# Patient Record
Sex: MALE | Race: ASIAN | Employment: FULL TIME | URBAN - METROPOLITAN AREA
[De-identification: names, ages, dates, MRNs, and addresses within clinical notes are randomized per-mention and may not be internally consistent; named-entity substitution may affect disease eponyms.]

---

## 2017-08-01 ENCOUNTER — MED REC SCAN ONLY (OUTPATIENT)
Dept: INTERNAL MEDICINE CLINIC | Facility: CLINIC | Age: 37
End: 2017-08-01

## 2017-08-15 ENCOUNTER — LAB ENCOUNTER (OUTPATIENT)
Dept: LAB | Facility: HOSPITAL | Age: 37
End: 2017-08-15
Attending: INTERNAL MEDICINE
Payer: COMMERCIAL

## 2017-08-15 DIAGNOSIS — K51.919 ULCERATIVE COLITIS WITH COMPLICATION, UNSPECIFIED LOCATION (HCC): ICD-10-CM

## 2017-08-15 LAB
25-HYDROXYVITAMIN D (TOTAL): 28.2 NG/ML (ref 30–100)
ALBUMIN SERPL-MCNC: 3.8 G/DL (ref 3.5–4.8)
ALP LIVER SERPL-CCNC: 63 U/L (ref 45–117)
ALT SERPL-CCNC: 17 U/L (ref 17–63)
AST SERPL-CCNC: 12 U/L (ref 15–41)
BASOPHILS # BLD AUTO: 0.05 X10(3) UL (ref 0–0.1)
BASOPHILS NFR BLD AUTO: 1.1 %
BILIRUB SERPL-MCNC: 0.7 MG/DL (ref 0.1–2)
BUN BLD-MCNC: 15 MG/DL (ref 8–20)
C-REACTIVE PROTEIN: <0.29 MG/DL (ref ?–1)
CALCIUM BLD-MCNC: 9.7 MG/DL (ref 8.3–10.3)
CHLORIDE: 105 MMOL/L (ref 101–111)
CO2: 31 MMOL/L (ref 22–32)
CREAT BLD-MCNC: 1.09 MG/DL (ref 0.7–1.3)
EOSINOPHIL # BLD AUTO: 0.14 X10(3) UL (ref 0–0.3)
EOSINOPHIL NFR BLD AUTO: 3 %
ERYTHROCYTE [DISTWIDTH] IN BLOOD BY AUTOMATED COUNT: 12.5 % (ref 11.5–16)
FOLATE (FOLIC ACID), SERUM: 24.5 NG/ML (ref 8.7–24)
GLUCOSE BLD-MCNC: 117 MG/DL (ref 70–99)
HAV AB SERPL IA-ACNC: 481 PG/ML (ref 193–986)
HCT VFR BLD AUTO: 39 % (ref 37–53)
HGB BLD-MCNC: 13.6 G/DL (ref 13–17)
IMMATURE GRANULOCYTE COUNT: 0.01 X10(3) UL (ref 0–1)
IMMATURE GRANULOCYTE RATIO %: 0.2 %
LYMPHOCYTES # BLD AUTO: 2.65 X10(3) UL (ref 0.9–4)
LYMPHOCYTES NFR BLD AUTO: 56.7 %
M PROTEIN MFR SERPL ELPH: 7.7 G/DL (ref 6.1–8.3)
MCH RBC QN AUTO: 31.9 PG (ref 27–33.2)
MCHC RBC AUTO-ENTMCNC: 34.9 G/DL (ref 31–37)
MCV RBC AUTO: 91.5 FL (ref 80–99)
MONOCYTES # BLD AUTO: 0.51 X10(3) UL (ref 0.1–0.6)
MONOCYTES NFR BLD AUTO: 10.9 %
NEUTROPHIL ABS PRELIM: 1.31 X10 (3) UL (ref 1.3–6.7)
NEUTROPHILS # BLD AUTO: 1.31 X10(3) UL (ref 1.3–6.7)
NEUTROPHILS NFR BLD AUTO: 28.1 %
PLATELET # BLD AUTO: 178 10(3)UL (ref 150–450)
POTASSIUM SERPL-SCNC: 3.8 MMOL/L (ref 3.6–5.1)
RBC # BLD AUTO: 4.26 X10(6)UL (ref 4.3–5.7)
RED CELL DISTRIBUTION WIDTH-SD: 40.8 FL (ref 35.1–46.3)
SODIUM SERPL-SCNC: 141 MMOL/L (ref 136–144)
WBC # BLD AUTO: 4.7 X10(3) UL (ref 4–13)

## 2017-08-15 PROCEDURE — 82306 VITAMIN D 25 HYDROXY: CPT

## 2017-08-15 PROCEDURE — 82746 ASSAY OF FOLIC ACID SERUM: CPT

## 2017-08-15 PROCEDURE — 85025 COMPLETE CBC W/AUTO DIFF WBC: CPT

## 2017-08-15 PROCEDURE — 36415 COLL VENOUS BLD VENIPUNCTURE: CPT

## 2017-08-15 PROCEDURE — 82607 VITAMIN B-12: CPT

## 2017-08-15 PROCEDURE — 80053 COMPREHEN METABOLIC PANEL: CPT

## 2017-08-15 PROCEDURE — 86140 C-REACTIVE PROTEIN: CPT

## 2018-02-14 ENCOUNTER — LAB ENCOUNTER (OUTPATIENT)
Dept: LAB | Facility: HOSPITAL | Age: 38
End: 2018-02-14
Attending: INTERNAL MEDICINE
Payer: COMMERCIAL

## 2018-02-14 DIAGNOSIS — K51.018 ULCERATIVE PANCOLITIS WITH OTHER COMPLICATION (HCC): ICD-10-CM

## 2018-02-14 PROCEDURE — 83993 ASSAY FOR CALPROTECTIN FECAL: CPT

## 2018-02-14 PROCEDURE — 87493 C DIFF AMPLIFIED PROBE: CPT

## 2018-02-15 NOTE — PROGRESS NOTES
Results reviewed. Released to [address] Tests show no significant abnormalities. Patient notified.

## 2018-02-17 LAB — CALPROTECTIN, FECAL: 60 UG/G

## 2018-04-21 ENCOUNTER — LAB ENCOUNTER (OUTPATIENT)
Dept: LAB | Facility: HOSPITAL | Age: 38
End: 2018-04-21
Attending: INTERNAL MEDICINE
Payer: COMMERCIAL

## 2018-04-21 DIAGNOSIS — K51.00 ULCERATIVE PANCOLITIS WITHOUT COMPLICATION (HCC): ICD-10-CM

## 2018-04-21 PROCEDURE — 82306 VITAMIN D 25 HYDROXY: CPT

## 2018-04-21 PROCEDURE — 86140 C-REACTIVE PROTEIN: CPT

## 2018-04-21 PROCEDURE — 83540 ASSAY OF IRON: CPT

## 2018-04-21 PROCEDURE — 85025 COMPLETE CBC W/AUTO DIFF WBC: CPT

## 2018-04-21 PROCEDURE — 80053 COMPREHEN METABOLIC PANEL: CPT

## 2018-04-21 PROCEDURE — 83550 IRON BINDING TEST: CPT

## 2018-04-21 PROCEDURE — 82607 VITAMIN B-12: CPT

## 2018-04-21 PROCEDURE — 36415 COLL VENOUS BLD VENIPUNCTURE: CPT

## 2018-06-15 ENCOUNTER — LAB ENCOUNTER (OUTPATIENT)
Dept: LAB | Facility: HOSPITAL | Age: 38
End: 2018-06-15
Attending: INTERNAL MEDICINE
Payer: COMMERCIAL

## 2018-06-15 DIAGNOSIS — K51.00 ULCERATIVE CHRONIC PANCOLITIS WITHOUT COMPLICATIONS (HCC): ICD-10-CM

## 2018-06-15 PROCEDURE — 86480 TB TEST CELL IMMUN MEASURE: CPT

## 2018-06-15 PROCEDURE — 87340 HEPATITIS B SURFACE AG IA: CPT

## 2018-06-15 PROCEDURE — 36415 COLL VENOUS BLD VENIPUNCTURE: CPT

## 2018-06-15 PROCEDURE — 86706 HEP B SURFACE ANTIBODY: CPT

## 2018-06-15 PROCEDURE — 86704 HEP B CORE ANTIBODY TOTAL: CPT

## 2018-06-20 NOTE — PROGRESS NOTES
Results reviewed. Released to [address] Has no immunity to hepatitis B.  Needs vaccination series, please notify pt to set up with PCP

## 2018-08-16 ENCOUNTER — LAB ENCOUNTER (OUTPATIENT)
Dept: LAB | Facility: HOSPITAL | Age: 38
End: 2018-08-16
Attending: INTERNAL MEDICINE
Payer: COMMERCIAL

## 2018-08-16 DIAGNOSIS — K51.019 CHRONIC ULCERATIVE ENTEROCOLITIS WITH COMPLICATION (HCC): ICD-10-CM

## 2018-08-16 LAB
ALBUMIN SERPL-MCNC: 4 G/DL (ref 3.5–4.8)
ALBUMIN/GLOB SERPL: 1 {RATIO} (ref 1–2)
ALP LIVER SERPL-CCNC: 53 U/L (ref 45–117)
ALT SERPL-CCNC: 22 U/L (ref 17–63)
ANION GAP SERPL CALC-SCNC: 3 MMOL/L (ref 0–18)
AST SERPL-CCNC: 15 U/L (ref 15–41)
BASOPHILS # BLD AUTO: 0.05 X10(3) UL (ref 0–0.1)
BASOPHILS NFR BLD AUTO: 0.8 %
BILIRUB SERPL-MCNC: 0.7 MG/DL (ref 0.1–2)
BUN BLD-MCNC: 19 MG/DL (ref 8–20)
BUN/CREAT SERPL: 18.8 (ref 10–20)
CALCIUM BLD-MCNC: 8.9 MG/DL (ref 8.3–10.3)
CHLORIDE SERPL-SCNC: 106 MMOL/L (ref 101–111)
CO2 SERPL-SCNC: 31 MMOL/L (ref 22–32)
CREAT BLD-MCNC: 1.01 MG/DL (ref 0.7–1.3)
EOSINOPHIL # BLD AUTO: 0.19 X10(3) UL (ref 0–0.3)
EOSINOPHIL NFR BLD AUTO: 2.9 %
ERYTHROCYTE [DISTWIDTH] IN BLOOD BY AUTOMATED COUNT: 12.2 % (ref 11.5–16)
GLOBULIN PLAS-MCNC: 3.9 G/DL (ref 2.5–4)
GLUCOSE BLD-MCNC: 107 MG/DL (ref 70–99)
HCT VFR BLD AUTO: 41.1 % (ref 37–53)
HGB BLD-MCNC: 13.7 G/DL (ref 13–17)
IMMATURE GRANULOCYTE COUNT: 0.02 X10(3) UL (ref 0–1)
IMMATURE GRANULOCYTE RATIO %: 0.3 %
LYMPHOCYTES # BLD AUTO: 2.64 X10(3) UL (ref 0.9–4)
LYMPHOCYTES NFR BLD AUTO: 40.9 %
M PROTEIN MFR SERPL ELPH: 7.9 G/DL (ref 6.1–8.3)
MCH RBC QN AUTO: 30.2 PG (ref 27–33.2)
MCHC RBC AUTO-ENTMCNC: 33.3 G/DL (ref 31–37)
MCV RBC AUTO: 90.5 FL (ref 80–99)
MONOCYTES # BLD AUTO: 0.57 X10(3) UL (ref 0.1–1)
MONOCYTES NFR BLD AUTO: 8.8 %
NEUTROPHIL ABS PRELIM: 2.99 X10 (3) UL (ref 1.3–6.7)
NEUTROPHILS # BLD AUTO: 2.99 X10(3) UL (ref 1.3–6.7)
NEUTROPHILS NFR BLD AUTO: 46.3 %
OSMOLALITY SERPL CALC.SUM OF ELEC: 293 MOSM/KG (ref 275–295)
PLATELET # BLD AUTO: 217 10(3)UL (ref 150–450)
POTASSIUM SERPL-SCNC: 4 MMOL/L (ref 3.6–5.1)
RBC # BLD AUTO: 4.54 X10(6)UL (ref 4.3–5.7)
RED CELL DISTRIBUTION WIDTH-SD: 39.8 FL (ref 35.1–46.3)
SODIUM SERPL-SCNC: 140 MMOL/L (ref 136–144)
WBC # BLD AUTO: 6.5 X10(3) UL (ref 4–13)

## 2018-08-16 PROCEDURE — 80299 QUANTITATIVE ASSAY DRUG: CPT

## 2018-08-16 PROCEDURE — 36415 COLL VENOUS BLD VENIPUNCTURE: CPT

## 2018-08-16 PROCEDURE — 80053 COMPREHEN METABOLIC PANEL: CPT

## 2018-08-16 PROCEDURE — 86352 CELL FUNCTION ASSAY W/STIM: CPT

## 2018-08-16 PROCEDURE — 85025 COMPLETE CBC W/AUTO DIFF WBC: CPT

## 2018-08-18 LAB
INFLIXIMAB ACTIVITY: 1.96 UG/ML
INFLIXIMAB NEUTRALIZING AB TTR: NOT DETECTED

## 2018-08-20 ENCOUNTER — OFFICE VISIT (OUTPATIENT)
Dept: INTERNAL MEDICINE CLINIC | Facility: CLINIC | Age: 38
End: 2018-08-20
Payer: COMMERCIAL

## 2018-08-20 ENCOUNTER — TELEPHONE (OUTPATIENT)
Dept: INTERNAL MEDICINE CLINIC | Facility: CLINIC | Age: 38
End: 2018-08-20

## 2018-08-20 VITALS
SYSTOLIC BLOOD PRESSURE: 102 MMHG | DIASTOLIC BLOOD PRESSURE: 66 MMHG | HEIGHT: 72 IN | BODY MASS INDEX: 26.55 KG/M2 | WEIGHT: 196 LBS | RESPIRATION RATE: 16 BRPM | HEART RATE: 76 BPM | TEMPERATURE: 98 F

## 2018-08-20 DIAGNOSIS — M54.42 CHRONIC LEFT-SIDED LOW BACK PAIN WITH LEFT-SIDED SCIATICA: Primary | ICD-10-CM

## 2018-08-20 DIAGNOSIS — Z13.228 SCREENING FOR METABOLIC DISORDER: ICD-10-CM

## 2018-08-20 DIAGNOSIS — Z13.89 SCREENING FOR BLOOD OR PROTEIN IN URINE: ICD-10-CM

## 2018-08-20 DIAGNOSIS — Z13.29 SCREENING FOR THYROID DISORDER: ICD-10-CM

## 2018-08-20 DIAGNOSIS — Z13.1 SCREENING FOR DIABETES MELLITUS (DM): ICD-10-CM

## 2018-08-20 DIAGNOSIS — Z13.0 SCREENING, ANEMIA, DEFICIENCY, IRON: ICD-10-CM

## 2018-08-20 DIAGNOSIS — G89.29 CHRONIC LEFT-SIDED LOW BACK PAIN WITH LEFT-SIDED SCIATICA: Primary | ICD-10-CM

## 2018-08-20 DIAGNOSIS — Z12.83 SKIN EXAM FOR MALIGNANT NEOPLASM: ICD-10-CM

## 2018-08-20 DIAGNOSIS — Z13.220 SCREENING FOR LIPID DISORDERS: ICD-10-CM

## 2018-08-20 PROCEDURE — 99214 OFFICE O/P EST MOD 30 MIN: CPT | Performed by: INTERNAL MEDICINE

## 2018-08-20 RX ORDER — INFLIXIMAB 100 MG/10ML
900 INJECTION, POWDER, LYOPHILIZED, FOR SOLUTION INTRAVENOUS
COMMUNITY
Start: 2016-01-28 | End: 2018-12-10 | Stop reason: ALTCHOICE

## 2018-08-20 NOTE — PATIENT INSTRUCTIONS
Self-Care for Low Back Pain    Most people have low back pain now and then. In many cases, it isn’t serious and self-care can help. Sometimes low back pain can be a sign of a bigger problem.  Call your healthcare provider if your pain returns often or get · You have a sharp, stabbing pain. · Your pain is constant. · You have pain, tingling, or numbness in your leg. · You feel pain in a new area of your back. · You notice that the pain isn’t decreasing after more than a week.    Date Last Reviewed: 3/1/20

## 2018-08-23 LAB
6-METHYLMERCAPTOPURINE (6-MMP): 2004
6-THIOGUANINE (6-GT): 124

## 2018-09-05 ENCOUNTER — HOSPITAL ENCOUNTER (OUTPATIENT)
Dept: MRI IMAGING | Facility: HOSPITAL | Age: 38
Discharge: HOME | End: 2018-09-05
Attending: INTERNAL MEDICINE
Payer: COMMERCIAL

## 2018-09-05 DIAGNOSIS — G89.29 CHRONIC LEFT-SIDED LOW BACK PAIN WITH LEFT-SIDED SCIATICA: ICD-10-CM

## 2018-09-05 DIAGNOSIS — M54.42 CHRONIC LEFT-SIDED LOW BACK PAIN WITH LEFT-SIDED SCIATICA: ICD-10-CM

## 2018-09-05 PROCEDURE — 72148 MRI LUMBAR SPINE W/O DYE: CPT | Performed by: INTERNAL MEDICINE

## 2018-09-06 ENCOUNTER — LAB ENCOUNTER (OUTPATIENT)
Dept: LAB | Facility: HOSPITAL | Age: 38
End: 2018-09-06
Attending: INTERNAL MEDICINE
Payer: COMMERCIAL

## 2018-09-06 ENCOUNTER — TELEPHONE (OUTPATIENT)
Dept: INTERNAL MEDICINE CLINIC | Facility: CLINIC | Age: 38
End: 2018-09-06

## 2018-09-06 DIAGNOSIS — M54.42 CHRONIC LEFT-SIDED LOW BACK PAIN WITH BILATERAL SCIATICA: Primary | ICD-10-CM

## 2018-09-06 DIAGNOSIS — G89.29 CHRONIC LEFT-SIDED LOW BACK PAIN WITH BILATERAL SCIATICA: Primary | ICD-10-CM

## 2018-09-06 DIAGNOSIS — K51.018 ULCERATIVE PANCOLITIS WITH OTHER COMPLICATION (HCC): ICD-10-CM

## 2018-09-06 DIAGNOSIS — M54.41 CHRONIC LEFT-SIDED LOW BACK PAIN WITH BILATERAL SCIATICA: Primary | ICD-10-CM

## 2018-09-06 DIAGNOSIS — M48.00 SPINAL STENOSIS, UNSPECIFIED SPINAL REGION: ICD-10-CM

## 2018-09-06 LAB
ALBUMIN SERPL-MCNC: 3.6 G/DL (ref 3.5–4.8)
ALBUMIN/GLOB SERPL: 0.9 {RATIO} (ref 1–2)
ALP LIVER SERPL-CCNC: 55 U/L (ref 45–117)
ALT SERPL-CCNC: 23 U/L (ref 17–63)
ANION GAP SERPL CALC-SCNC: 6 MMOL/L (ref 0–18)
AST SERPL-CCNC: 19 U/L (ref 15–41)
BASOPHILS # BLD AUTO: 0.05 X10(3) UL (ref 0–0.1)
BASOPHILS NFR BLD AUTO: 0.7 %
BILIRUB SERPL-MCNC: 0.8 MG/DL (ref 0.1–2)
BUN BLD-MCNC: 17 MG/DL (ref 8–20)
BUN/CREAT SERPL: 14.9 (ref 10–20)
CALCIUM BLD-MCNC: 8.6 MG/DL (ref 8.3–10.3)
CHLORIDE SERPL-SCNC: 105 MMOL/L (ref 101–111)
CO2 SERPL-SCNC: 30 MMOL/L (ref 22–32)
CREAT BLD-MCNC: 1.14 MG/DL (ref 0.7–1.3)
CRP SERPL-MCNC: <0.29 MG/DL (ref ?–1)
EOSINOPHIL # BLD AUTO: 0.16 X10(3) UL (ref 0–0.3)
EOSINOPHIL NFR BLD AUTO: 2.2 %
ERYTHROCYTE [DISTWIDTH] IN BLOOD BY AUTOMATED COUNT: 12.8 % (ref 11.5–16)
GLOBULIN PLAS-MCNC: 4 G/DL (ref 2.5–4)
GLUCOSE BLD-MCNC: 132 MG/DL (ref 70–99)
HCT VFR BLD AUTO: 42.2 % (ref 37–53)
HGB BLD-MCNC: 14.8 G/DL (ref 13–17)
IMMATURE GRANULOCYTE COUNT: 0.03 X10(3) UL (ref 0–1)
IMMATURE GRANULOCYTE RATIO %: 0.4 %
LIPASE: 123 U/L (ref 73–393)
LYMPHOCYTES # BLD AUTO: 2.33 X10(3) UL (ref 0.9–4)
LYMPHOCYTES NFR BLD AUTO: 31.9 %
M PROTEIN MFR SERPL ELPH: 7.6 G/DL (ref 6.1–8.3)
MCH RBC QN AUTO: 31.8 PG (ref 27–33.2)
MCHC RBC AUTO-ENTMCNC: 35.1 G/DL (ref 31–37)
MCV RBC AUTO: 90.6 FL (ref 80–99)
MONOCYTES # BLD AUTO: 0.57 X10(3) UL (ref 0.1–1)
MONOCYTES NFR BLD AUTO: 7.8 %
NEUTROPHIL ABS PRELIM: 4.16 X10 (3) UL (ref 1.3–6.7)
NEUTROPHILS # BLD AUTO: 4.16 X10(3) UL (ref 1.3–6.7)
NEUTROPHILS NFR BLD AUTO: 57 %
OSMOLALITY SERPL CALC.SUM OF ELEC: 295 MOSM/KG (ref 275–295)
PLATELET # BLD AUTO: 208 10(3)UL (ref 150–450)
POTASSIUM SERPL-SCNC: 3.6 MMOL/L (ref 3.6–5.1)
RBC # BLD AUTO: 4.66 X10(6)UL (ref 4.3–5.7)
RED CELL DISTRIBUTION WIDTH-SD: 41.7 FL (ref 35.1–46.3)
SED RATE-ML: 78 MM/HR (ref 0–12)
SODIUM SERPL-SCNC: 141 MMOL/L (ref 136–144)
WBC # BLD AUTO: 7.3 X10(3) UL (ref 4–13)

## 2018-09-06 PROCEDURE — 36415 COLL VENOUS BLD VENIPUNCTURE: CPT

## 2018-09-06 PROCEDURE — 87493 C DIFF AMPLIFIED PROBE: CPT

## 2018-09-06 PROCEDURE — 83690 ASSAY OF LIPASE: CPT

## 2018-09-06 PROCEDURE — 85652 RBC SED RATE AUTOMATED: CPT

## 2018-09-06 PROCEDURE — 80053 COMPREHEN METABOLIC PANEL: CPT

## 2018-09-06 PROCEDURE — 86140 C-REACTIVE PROTEIN: CPT

## 2018-09-06 PROCEDURE — 85025 COMPLETE CBC W/AUTO DIFF WBC: CPT

## 2018-09-06 NOTE — TELEPHONE ENCOUNTER
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

## 2018-09-06 NOTE — TELEPHONE ENCOUNTER
Glenda Dash MD   Pain Medicine   [facility]. [facility] [address] [place] [address]   Phone: [phone]   Fax: [phone]      Pt is aware of the results. The referral was entered.  The pt will call the office back for the

## 2018-09-07 ENCOUNTER — HOSPITAL ENCOUNTER (EMERGENCY)
Facility: HOSPITAL | Age: 38
Discharge: HOME | End: 2018-09-07
Attending: EMERGENCY MEDICINE
Payer: COMMERCIAL

## 2018-09-07 ENCOUNTER — APPOINTMENT (OUTPATIENT)
Dept: CT IMAGING | Facility: HOSPITAL | Age: 38
End: 2018-09-07
Attending: EMERGENCY MEDICINE
Payer: COMMERCIAL

## 2018-09-07 VITALS
WEIGHT: 192 LBS | OXYGEN SATURATION: 98 % | HEART RATE: 65 BPM | TEMPERATURE: 98 F | DIASTOLIC BLOOD PRESSURE: 77 MMHG | SYSTOLIC BLOOD PRESSURE: 111 MMHG | HEIGHT: 71 IN | BODY MASS INDEX: 26.88 KG/M2 | RESPIRATION RATE: 14 BRPM

## 2018-09-07 DIAGNOSIS — K51.00 ULCERATIVE PANCOLITIS WITHOUT COMPLICATION (HCC): Primary | ICD-10-CM

## 2018-09-07 LAB
ALBUMIN SERPL-MCNC: 3.6 G/DL (ref 3.5–4.8)
ALBUMIN/GLOB SERPL: 0.9 {RATIO} (ref 1–2)
ALP LIVER SERPL-CCNC: 55 U/L (ref 45–117)
ALT SERPL-CCNC: 18 U/L (ref 17–63)
ANION GAP SERPL CALC-SCNC: 4 MMOL/L (ref 0–18)
AST SERPL-CCNC: 29 U/L (ref 15–41)
BASOPHILS # BLD AUTO: 0.06 X10(3) UL (ref 0–0.1)
BASOPHILS NFR BLD AUTO: 0.8 %
BILIRUB SERPL-MCNC: 0.8 MG/DL (ref 0.1–2)
BUN BLD-MCNC: 15 MG/DL (ref 8–20)
BUN/CREAT SERPL: 15.5 (ref 10–20)
CALCIUM BLD-MCNC: 8.8 MG/DL (ref 8.3–10.3)
CHLORIDE SERPL-SCNC: 106 MMOL/L (ref 101–111)
CO2 SERPL-SCNC: 30 MMOL/L (ref 22–32)
CREAT BLD-MCNC: 0.97 MG/DL (ref 0.7–1.3)
EOSINOPHIL # BLD AUTO: 0.17 X10(3) UL (ref 0–0.3)
EOSINOPHIL NFR BLD AUTO: 2.3 %
ERYTHROCYTE [DISTWIDTH] IN BLOOD BY AUTOMATED COUNT: 12.8 % (ref 11.5–16)
GLOBULIN PLAS-MCNC: 4 G/DL (ref 2.5–4)
GLUCOSE BLD-MCNC: 99 MG/DL (ref 70–99)
HCT VFR BLD AUTO: 42.3 % (ref 37–53)
HGB BLD-MCNC: 14.5 G/DL (ref 13–17)
IMMATURE GRANULOCYTE COUNT: 0.03 X10(3) UL (ref 0–1)
IMMATURE GRANULOCYTE RATIO %: 0.4 %
LYMPHOCYTES # BLD AUTO: 2.63 X10(3) UL (ref 0.9–4)
LYMPHOCYTES NFR BLD AUTO: 34.9 %
M PROTEIN MFR SERPL ELPH: 7.6 G/DL (ref 6.1–8.3)
MCH RBC QN AUTO: 31 PG (ref 27–33.2)
MCHC RBC AUTO-ENTMCNC: 34.3 G/DL (ref 31–37)
MCV RBC AUTO: 90.4 FL (ref 80–99)
MONOCYTES # BLD AUTO: 0.67 X10(3) UL (ref 0.1–1)
MONOCYTES NFR BLD AUTO: 8.9 %
NEUTROPHIL ABS PRELIM: 3.97 X10 (3) UL (ref 1.3–6.7)
NEUTROPHILS # BLD AUTO: 3.97 X10(3) UL (ref 1.3–6.7)
NEUTROPHILS NFR BLD AUTO: 52.7 %
OSMOLALITY SERPL CALC.SUM OF ELEC: 291 MOSM/KG (ref 275–295)
PLATELET # BLD AUTO: 174 10(3)UL (ref 150–450)
POTASSIUM SERPL-SCNC: 3.6 MMOL/L (ref 3.6–5.1)
RBC # BLD AUTO: 4.68 X10(6)UL (ref 4.3–5.7)
RED CELL DISTRIBUTION WIDTH-SD: 41.6 FL (ref 35.1–46.3)
SODIUM SERPL-SCNC: 140 MMOL/L (ref 136–144)
WBC # BLD AUTO: 7.5 X10(3) UL (ref 4–13)

## 2018-09-07 PROCEDURE — 85025 COMPLETE CBC W/AUTO DIFF WBC: CPT | Performed by: EMERGENCY MEDICINE

## 2018-09-07 PROCEDURE — S0028 INJECTION, FAMOTIDINE, 20 MG: HCPCS | Performed by: EMERGENCY MEDICINE

## 2018-09-07 PROCEDURE — 99284 EMERGENCY DEPT VISIT MOD MDM: CPT

## 2018-09-07 PROCEDURE — 74177 CT ABD & PELVIS W/CONTRAST: CPT | Performed by: EMERGENCY MEDICINE

## 2018-09-07 PROCEDURE — 96361 HYDRATE IV INFUSION ADD-ON: CPT

## 2018-09-07 PROCEDURE — 96375 TX/PRO/DX INJ NEW DRUG ADDON: CPT

## 2018-09-07 PROCEDURE — 96374 THER/PROPH/DIAG INJ IV PUSH: CPT

## 2018-09-07 PROCEDURE — 80053 COMPREHEN METABOLIC PANEL: CPT | Performed by: EMERGENCY MEDICINE

## 2018-09-07 RX ORDER — PREDNISONE 10 MG/1
TABLET ORAL
Qty: 120 TABLET | Refills: 0 | OUTPATIENT
Start: 2018-09-07 | End: 2019-12-26

## 2018-09-07 RX ORDER — DIPHENHYDRAMINE HYDROCHLORIDE 50 MG/ML
50 INJECTION INTRAMUSCULAR; INTRAVENOUS ONCE
Status: COMPLETED | OUTPATIENT
Start: 2018-09-07 | End: 2018-09-07

## 2018-09-07 RX ORDER — AZATHIOPRINE 50 MG/1
175 TABLET ORAL DAILY
Qty: 105 TABLET | Refills: 3 | OUTPATIENT
Start: 2018-09-07 | End: 2018-10-07

## 2018-09-07 RX ORDER — DICYCLOMINE HYDROCHLORIDE 10 MG/1
10 CAPSULE ORAL 3 TIMES DAILY
Qty: 90 CAPSULE | Refills: 1 | OUTPATIENT
Start: 2018-09-07 | End: 2018-10-05

## 2018-09-07 RX ORDER — METHYLPREDNISOLONE SODIUM SUCCINATE 125 MG/2ML
125 INJECTION, POWDER, LYOPHILIZED, FOR SOLUTION INTRAMUSCULAR; INTRAVENOUS ONCE
Status: COMPLETED | OUTPATIENT
Start: 2018-09-07 | End: 2018-09-07

## 2018-09-07 RX ORDER — ONDANSETRON 2 MG/ML
4 INJECTION INTRAMUSCULAR; INTRAVENOUS ONCE
Status: COMPLETED | OUTPATIENT
Start: 2018-09-07 | End: 2018-09-07

## 2018-09-07 RX ORDER — MORPHINE SULFATE 4 MG/ML
4 INJECTION, SOLUTION INTRAMUSCULAR; INTRAVENOUS ONCE
Status: COMPLETED | OUTPATIENT
Start: 2018-09-07 | End: 2018-09-07

## 2018-09-07 RX ORDER — FAMOTIDINE 10 MG/ML
20 INJECTION, SOLUTION INTRAVENOUS ONCE
Status: COMPLETED | OUTPATIENT
Start: 2018-09-07 | End: 2018-09-07

## 2018-09-07 NOTE — ED NOTES
Prescriptions called into patient's preferred pharmacy [facility]. Patient provided with discharge education, verbalized understanding. IV removed intact. Ambulatory with a steady gait for discharge.

## 2018-09-07 NOTE — ED PROVIDER NOTES
Patient Seen in: [facility] Emergency Department    History   Patient presents with:  Abdomen/Flank Pain (GI/)    Stated Complaint:     HPI    79-year-old male presents emergency department complaining of lower abdominal pain intermittent diarrhea bilaterally no crackles no wheezes no accessory muscle use  Abdomen: Patient has left lower quadrant tenderness on exam no rebound no guarding  no hepatosplenomegaly bowel sounds are present , no pulsatile mass  Extremities: No clubbing cyanosis or edema 2 condition and instructed to take medications as prescribed. Patient is aware that they are to return to ED if any worsening problems. Patient was also instructed to followup with the appropriate physician. Patient verbalizes and agrees with plan.   Nuris

## 2018-09-07 NOTE — CONSULTS
Gastroenterology Initial Consultation  I have personally seen and examined the patient.     Patient Name: Alpesh George  Referring physician: Dr. Michelle Grande / Dr. You Morrison  Reason for consultation: Abdominal pain, ulcerative colitis  CC: Abdominal pain HPI: This is a 42 yo [facility]) male who was initially diagnosed with pan-colonic ulcerative colitis, in 2001. At that time, he had presented with abdominal pain, and bloody diarrhea.   Over the years, he has been managed with prednisone, mesalamine-based agen 1 week ago, the patient began to notice a intermittent discomfort in the left lower quadrant. This pain seemed to progressively intensify during the course of this week. In fact, over the past 2 days, the pain has become constant.   He has had associated SocHx:  No history of smoking; The patient drinks alcohol on social occasions; The patient has no history of IV drug use or other illicit substances. FamHx: The patient has no family history of colon cancer or other gastrointestinal malignancies;   No fam Resp: Clear to auscultation bilaterally without wheezes; rubs, rhonchi, or rales  Abdomen: Soft, (+)tender to deep palpation in the left lower quadrant;  (+) tender to moderate palpation with minimal voluntary guarding in the right lower quadrant.   Non-dis Specimen Collected: 09/06/18  3:26 PM Last Resulted: 09/06/18  4:32 PM                Related Result Highlights             Infliximab Activity ug/mL 1.96    Comments: INTERPRETIVE INFORMATION: Infliximab and Infliximab-dyyb                             Act or greater     greater       rule out decreasing infliximab                                activity and/or increasing                                infliximab neutralizing                                antibodies.      Test developed and characteristics d Impression: This is a 42 yo male who is otherwise healthy. He has pan-colonic ulcerative colitis. He has been on Humira and now Remicade with Imuran. His current Remicade and thiopurine metabolites are subtherapeutic.   He has active disease, and reports I have personally reviewed the CT scan with Dr. April Sr, who reported diffuse mild colonic wall thickening, but no tiarra-colonic stranding; No diverticulitis; No abscess.

## 2018-09-07 NOTE — ED NOTES
Patient back from CT scan, denies rash, itching, or difficulty breathing. States pain is improved. Updated on wait for results. Resting comfortably at this time.

## 2018-09-07 NOTE — ED INITIAL ASSESSMENT (HPI)
Patient presents with lower abdominal pain and intermittent diarrhea/nausea for one week. Denies fever.

## 2018-09-14 NOTE — TELEPHONE ENCOUNTER
Relayed results and recommendations to patient and he agreed with plan. Dr. Manuel Santoyo information given.

## 2018-10-05 ENCOUNTER — OFFICE VISIT (OUTPATIENT)
Dept: INTERNAL MEDICINE CLINIC | Facility: CLINIC | Age: 38
End: 2018-10-05
Payer: COMMERCIAL

## 2018-10-05 VITALS
TEMPERATURE: 98 F | HEIGHT: 72 IN | HEART RATE: 80 BPM | SYSTOLIC BLOOD PRESSURE: 118 MMHG | RESPIRATION RATE: 16 BRPM | WEIGHT: 196 LBS | BODY MASS INDEX: 26.55 KG/M2 | OXYGEN SATURATION: 97 % | DIASTOLIC BLOOD PRESSURE: 66 MMHG

## 2018-10-05 DIAGNOSIS — Z23 NEED FOR INFLUENZA VACCINATION: ICD-10-CM

## 2018-10-05 DIAGNOSIS — Z00.00 LABORATORY EXAMINATION ORDERED AS PART OF A ROUTINE GENERAL MEDICAL EXAMINATION: ICD-10-CM

## 2018-10-05 DIAGNOSIS — Z00.00 ANNUAL PHYSICAL EXAM: Primary | ICD-10-CM

## 2018-10-05 DIAGNOSIS — Z13.89 SCREENING FOR GENITOURINARY CONDITION: ICD-10-CM

## 2018-10-05 DIAGNOSIS — Z13.29 THYROID DISORDER SCREEN: ICD-10-CM

## 2018-10-05 DIAGNOSIS — Z13.220 LIPID SCREENING: ICD-10-CM

## 2018-10-05 DIAGNOSIS — Z13.0 SCREENING, IRON DEFICIENCY ANEMIA: ICD-10-CM

## 2018-10-05 PROCEDURE — 99395 PREV VISIT EST AGE 18-39: CPT | Performed by: INTERNAL MEDICINE

## 2018-10-05 PROCEDURE — 90471 IMMUNIZATION ADMIN: CPT | Performed by: INTERNAL MEDICINE

## 2018-10-05 PROCEDURE — 90686 IIV4 VACC NO PRSV 0.5 ML IM: CPT | Performed by: INTERNAL MEDICINE

## 2018-10-05 NOTE — PROGRESS NOTES
HPI:    Patient ID: James Dorsey is a 43year old male. HPI  James Dorsey is a 43year old male who presents for a complete physical exam.   HPI:   Pt complains of nothing today UC controlled on current immunosuppressant  No back pain currently.     PA History:  No date: APPENDECTOMY   Family History   Problem Relation Age of Onset   • Heart Disease Father    • Hypertension Mother       Social History:  Social History    Tobacco Use      Smoking status: Never Smoker      Smokeless tobacco: Never Used exercise 30 minutes three times weekly. Health maintenance, will check fasting Lipids, CMP, CBC. Pt has had a screening colonoscopy.  Pt info handouts given for: exercise, low fat diet, testicular self exam. The patient indicates understanding of these issu

## 2018-11-16 ENCOUNTER — LAB ENCOUNTER (OUTPATIENT)
Dept: LAB | Facility: HOSPITAL | Age: 38
End: 2018-11-16
Attending: INTERNAL MEDICINE
Payer: COMMERCIAL

## 2018-11-16 DIAGNOSIS — K52.9 CHRONIC DIARRHEA: ICD-10-CM

## 2018-11-16 DIAGNOSIS — K51.011 ULCERATIVE PANCOLITIS WITH RECTAL BLEEDING (HCC): ICD-10-CM

## 2018-11-16 PROCEDURE — 86140 C-REACTIVE PROTEIN: CPT

## 2018-11-16 PROCEDURE — 36415 COLL VENOUS BLD VENIPUNCTURE: CPT

## 2018-11-16 PROCEDURE — 85025 COMPLETE CBC W/AUTO DIFF WBC: CPT

## 2018-11-16 PROCEDURE — 82533 TOTAL CORTISOL: CPT

## 2018-11-16 PROCEDURE — 80053 COMPREHEN METABOLIC PANEL: CPT

## 2018-12-19 DIAGNOSIS — M54.50 ACUTE LEFT-SIDED LOW BACK PAIN WITHOUT SCIATICA: Primary | ICD-10-CM

## 2018-12-19 RX ORDER — DIAZEPAM 5 MG/1
5 TABLET ORAL EVERY 12 HOURS PRN
Qty: 14 TABLET | Refills: 0 | OUTPATIENT
Start: 2018-12-19 | End: 2020-10-06

## 2018-12-19 NOTE — TELEPHONE ENCOUNTER
Spoke with pt he reports injuring his back a few weeks ago after moving furniture. He reports pain is in his lower back and mainly on the left side but now it seems to be moving back toward the spine.   Pt reports muscle relaxants have no helped in the pas

## 2018-12-19 NOTE — TELEPHONE ENCOUNTER
Patient says has talked with Dr Nba Dinh about his back pain before but he re-injured his back about 2 wks ago and wanted to know what Nba Dinh can recommend for his pain.

## 2019-03-09 ENCOUNTER — HOSPITAL ENCOUNTER (EMERGENCY)
Facility: HOSPITAL | Age: 39
Discharge: HOME | End: 2019-03-09
Attending: EMERGENCY MEDICINE
Payer: COMMERCIAL

## 2019-03-09 ENCOUNTER — APPOINTMENT (OUTPATIENT)
Dept: GENERAL RADIOLOGY | Facility: HOSPITAL | Age: 39
End: 2019-03-09
Attending: EMERGENCY MEDICINE
Payer: COMMERCIAL

## 2019-03-09 VITALS
HEART RATE: 93 BPM | HEIGHT: 74 IN | DIASTOLIC BLOOD PRESSURE: 62 MMHG | OXYGEN SATURATION: 98 % | TEMPERATURE: 99 F | WEIGHT: 192 LBS | BODY MASS INDEX: 24.64 KG/M2 | SYSTOLIC BLOOD PRESSURE: 95 MMHG | RESPIRATION RATE: 17 BRPM

## 2019-03-09 DIAGNOSIS — J11.1 INFLUENZA-LIKE ILLNESS: Primary | ICD-10-CM

## 2019-03-09 PROCEDURE — 87147 CULTURE TYPE IMMUNOLOGIC: CPT | Performed by: EMERGENCY MEDICINE

## 2019-03-09 PROCEDURE — 99283 EMERGENCY DEPT VISIT LOW MDM: CPT

## 2019-03-09 PROCEDURE — 87081 CULTURE SCREEN ONLY: CPT | Performed by: EMERGENCY MEDICINE

## 2019-03-09 PROCEDURE — 71046 X-RAY EXAM CHEST 2 VIEWS: CPT | Performed by: EMERGENCY MEDICINE

## 2019-03-09 PROCEDURE — 87430 STREP A AG IA: CPT | Performed by: EMERGENCY MEDICINE

## 2019-03-09 RX ORDER — OSELTAMIVIR PHOSPHATE 75 MG/1
75 CAPSULE ORAL 2 TIMES DAILY
Qty: 10 CAPSULE | Refills: 0 | Status: SHIPPED | OUTPATIENT
Start: 2019-03-09 | End: 2019-03-14

## 2019-03-10 NOTE — ED PROVIDER NOTES
Patient Seen in: [facility] Emergency Department    History   Patient presents with:  Fever (infectious)    Stated Complaint: flu    HPI    24-year-old male comes to the hospital complaint of having difficulty with fevers, chills biotics.   He has so bilaterally  Abdomen: Soft nontender nondistended normal active bowel sounds without rebound, guarding or masses noted  Back nontender without CVA tenderness  Extremity no clubbing, cyanosis or edema noted.   Neuro: No focal deficits noted    ED Course

## 2019-03-10 NOTE — ED INITIAL ASSESSMENT (HPI)
Pt ambulatory to er cc fever,weakness and coughing since Friday night. Family members also cold s/s.   Last dose theraful 1830 tonoc & also reports using left over augmentin this am;

## 2019-03-12 RX ORDER — AMOXICILLIN 875 MG/1
875 TABLET, COATED ORAL 2 TIMES DAILY
Qty: 20 TABLET | Refills: 0 | Status: SHIPPED | OUTPATIENT
Start: 2019-03-12 | End: 2019-03-22

## 2019-03-12 RX ORDER — PREDNISONE 20 MG/1
40 TABLET ORAL DAILY
Qty: 6 TABLET | Refills: 0 | Status: SHIPPED | OUTPATIENT
Start: 2019-03-12 | End: 2019-03-15

## 2019-07-24 ENCOUNTER — APPOINTMENT (OUTPATIENT)
Dept: LAB | Facility: HOSPITAL | Age: 39
End: 2019-07-24
Attending: INTERNAL MEDICINE
Payer: COMMERCIAL

## 2019-07-24 DIAGNOSIS — Z79.899 INFLIXIMAB (REMICADE) LONG-TERM USE: ICD-10-CM

## 2019-07-24 PROCEDURE — 36415 COLL VENOUS BLD VENIPUNCTURE: CPT

## 2019-07-24 PROCEDURE — 86480 TB TEST CELL IMMUN MEASURE: CPT

## 2019-07-26 LAB
M TB TUBERC IFN-G BLD QL: NEGATIVE
M TB TUBERC IFN-G/MITOGEN IGNF BLD: 0.02 IU/ML
M TB TUBERC IFN-G/MITOGEN IGNF BLD: 0.02 IU/ML
M TB TUBERC IGNF/MITOGEN IGNF CONTROL: >10 IU/ML
MITOGEN IGNF BCKGRD COR BLD-ACNC: 0.02 IU/ML

## 2019-09-04 ENCOUNTER — LAB ENCOUNTER (OUTPATIENT)
Dept: LAB | Facility: HOSPITAL | Age: 39
End: 2019-09-04
Attending: INTERNAL MEDICINE
Payer: COMMERCIAL

## 2019-09-04 DIAGNOSIS — Z51.81 THERAPEUTIC DRUG MONITORING: ICD-10-CM

## 2019-09-04 DIAGNOSIS — Z13.0 SCREENING, IRON DEFICIENCY ANEMIA: ICD-10-CM

## 2019-09-04 DIAGNOSIS — Z13.29 THYROID DISORDER SCREEN: ICD-10-CM

## 2019-09-04 DIAGNOSIS — Z00.00 LABORATORY EXAMINATION ORDERED AS PART OF A ROUTINE GENERAL MEDICAL EXAMINATION: ICD-10-CM

## 2019-09-04 DIAGNOSIS — Z13.220 LIPID SCREENING: ICD-10-CM

## 2019-09-04 DIAGNOSIS — Z13.89 SCREENING FOR GENITOURINARY CONDITION: ICD-10-CM

## 2019-09-04 DIAGNOSIS — K51.011 ULCERATIVE PANCOLITIS WITH RECTAL BLEEDING (HCC): ICD-10-CM

## 2019-09-04 LAB
ALBUMIN SERPL-MCNC: 4 G/DL (ref 3.4–5)
ALBUMIN/GLOB SERPL: 1 {RATIO} (ref 1–2)
ALP LIVER SERPL-CCNC: 55 U/L (ref 45–117)
ALT SERPL-CCNC: 20 U/L (ref 16–61)
ANION GAP SERPL CALC-SCNC: 6 MMOL/L (ref 0–18)
AST SERPL-CCNC: 18 U/L (ref 15–37)
BASOPHILS # BLD AUTO: 0.04 X10(3) UL (ref 0–0.2)
BASOPHILS NFR BLD AUTO: 0.7 %
BILIRUB SERPL-MCNC: 1.1 MG/DL (ref 0.1–2)
BILIRUB UR QL STRIP.AUTO: NEGATIVE
BUN BLD-MCNC: 15 MG/DL (ref 7–18)
BUN/CREAT SERPL: 16.7 (ref 10–20)
CALCIUM BLD-MCNC: 9.1 MG/DL (ref 8.5–10.1)
CHLORIDE SERPL-SCNC: 108 MMOL/L (ref 98–112)
CHOLEST SMN-MCNC: 171 MG/DL (ref ?–200)
CLARITY UR REFRACT.AUTO: CLEAR
CO2 SERPL-SCNC: 28 MMOL/L (ref 21–32)
COLOR UR AUTO: YELLOW
CREAT BLD-MCNC: 0.9 MG/DL (ref 0.7–1.3)
CRP SERPL-MCNC: <0.29 MG/DL (ref ?–0.3)
DEPRECATED RDW RBC AUTO: 43.1 FL (ref 35.1–46.3)
EOSINOPHIL # BLD AUTO: 0.47 X10(3) UL (ref 0–0.7)
EOSINOPHIL NFR BLD AUTO: 8.3 %
ERYTHROCYTE [DISTWIDTH] IN BLOOD BY AUTOMATED COUNT: 12.9 % (ref 11–15)
EST. AVERAGE GLUCOSE BLD GHB EST-MCNC: 108 MG/DL (ref 68–126)
GLOBULIN PLAS-MCNC: 4.1 G/DL (ref 2.8–4.4)
GLUCOSE BLD-MCNC: 93 MG/DL (ref 70–99)
GLUCOSE UR STRIP.AUTO-MCNC: NEGATIVE MG/DL
HBA1C MFR BLD HPLC: 5.4 % (ref ?–5.7)
HCT VFR BLD AUTO: 41.4 % (ref 39–53)
HDLC SERPL-MCNC: 53 MG/DL (ref 40–59)
HGB BLD-MCNC: 14.2 G/DL (ref 13–17.5)
IMM GRANULOCYTES # BLD AUTO: 0.04 X10(3) UL (ref 0–1)
IMM GRANULOCYTES NFR BLD: 0.7 %
KETONES UR STRIP.AUTO-MCNC: NEGATIVE MG/DL
LDLC SERPL CALC-MCNC: 97 MG/DL (ref ?–100)
LEUKOCYTE ESTERASE UR QL STRIP.AUTO: NEGATIVE
LYMPHOCYTES # BLD AUTO: 2.14 X10(3) UL (ref 1–4)
LYMPHOCYTES NFR BLD AUTO: 37.7 %
M PROTEIN MFR SERPL ELPH: 8.1 G/DL (ref 6.4–8.2)
MCH RBC QN AUTO: 31.6 PG (ref 26–34)
MCHC RBC AUTO-ENTMCNC: 34.3 G/DL (ref 31–37)
MCV RBC AUTO: 92.2 FL (ref 80–100)
MONOCYTES # BLD AUTO: 0.44 X10(3) UL (ref 0.1–1)
MONOCYTES NFR BLD AUTO: 7.8 %
NEUTROPHILS # BLD AUTO: 2.54 X10 (3) UL (ref 1.5–7.7)
NEUTROPHILS # BLD AUTO: 2.54 X10(3) UL (ref 1.5–7.7)
NEUTROPHILS NFR BLD AUTO: 44.8 %
NITRITE UR QL STRIP.AUTO: NEGATIVE
NONHDLC SERPL-MCNC: 118 MG/DL (ref ?–130)
OSMOLALITY SERPL CALC.SUM OF ELEC: 295 MOSM/KG (ref 275–295)
PH UR STRIP.AUTO: 5 [PH] (ref 4.5–8)
PLATELET # BLD AUTO: 205 10(3)UL (ref 150–450)
POTASSIUM SERPL-SCNC: 4 MMOL/L (ref 3.5–5.1)
PROT UR STRIP.AUTO-MCNC: NEGATIVE MG/DL
RBC # BLD AUTO: 4.49 X10(6)UL (ref 4.3–5.7)
RBC UR QL AUTO: NEGATIVE
SODIUM SERPL-SCNC: 142 MMOL/L (ref 136–145)
SP GR UR STRIP.AUTO: 1.03 (ref 1–1.03)
TRIGL SERPL-MCNC: 104 MG/DL (ref 30–149)
TSI SER-ACNC: 0.88 MIU/ML (ref 0.36–3.74)
UROBILINOGEN UR STRIP.AUTO-MCNC: <2 MG/DL
VLDLC SERPL CALC-MCNC: 21 MG/DL (ref 0–30)
WBC # BLD AUTO: 5.7 X10(3) UL (ref 4–11)

## 2019-09-04 PROCEDURE — 80053 COMPREHEN METABOLIC PANEL: CPT

## 2019-09-04 PROCEDURE — 85025 COMPLETE CBC W/AUTO DIFF WBC: CPT

## 2019-09-04 PROCEDURE — 80061 LIPID PANEL: CPT

## 2019-09-04 PROCEDURE — 84443 ASSAY THYROID STIM HORMONE: CPT

## 2019-09-04 PROCEDURE — 36415 COLL VENOUS BLD VENIPUNCTURE: CPT

## 2019-09-04 PROCEDURE — 80299 QUANTITATIVE ASSAY DRUG: CPT

## 2019-09-04 PROCEDURE — 83036 HEMOGLOBIN GLYCOSYLATED A1C: CPT

## 2019-09-04 PROCEDURE — 86140 C-REACTIVE PROTEIN: CPT

## 2019-09-04 PROCEDURE — 81003 URINALYSIS AUTO W/O SCOPE: CPT

## 2019-09-10 LAB
6-METHYLMERCAPTOPURINE (6-MMP): 807
6-THIOGUANINE (6-GT): 175

## 2019-09-26 NOTE — PROGRESS NOTES
Results reviewed. Released to [employer]. Spoke to pt who says he's overall feeling a lot better on Entyvio. Has 1-3 soft to semi-formed stools daily, no blood, or urgency. No nocturnal sx. Did not have fecal hamilton that was ordered in July.  Told to get it done

## 2019-10-17 ENCOUNTER — LAB ENCOUNTER (OUTPATIENT)
Dept: LAB | Facility: HOSPITAL | Age: 39
End: 2019-10-17
Attending: INTERNAL MEDICINE
Payer: COMMERCIAL

## 2019-10-17 DIAGNOSIS — K51.00 ULCERATIVE PANCOLITIS WITHOUT COMPLICATION (HCC): ICD-10-CM

## 2019-10-17 DIAGNOSIS — K51.011 ULCERATIVE PANCOLITIS WITH RECTAL BLEEDING (HCC): ICD-10-CM

## 2019-10-17 PROCEDURE — 83993 ASSAY FOR CALPROTECTIN FECAL: CPT

## 2019-10-22 ENCOUNTER — HOSPITAL ENCOUNTER (EMERGENCY)
Facility: HOSPITAL | Age: 39
Discharge: HOME | End: 2019-10-23
Attending: EMERGENCY MEDICINE
Payer: COMMERCIAL

## 2019-10-22 DIAGNOSIS — N13.2 URETERAL STONE WITH HYDRONEPHROSIS: Primary | ICD-10-CM

## 2019-10-22 PROCEDURE — 85025 COMPLETE CBC W/AUTO DIFF WBC: CPT

## 2019-10-22 PROCEDURE — 86140 C-REACTIVE PROTEIN: CPT | Performed by: EMERGENCY MEDICINE

## 2019-10-22 PROCEDURE — 96361 HYDRATE IV INFUSION ADD-ON: CPT

## 2019-10-22 PROCEDURE — 96375 TX/PRO/DX INJ NEW DRUG ADDON: CPT

## 2019-10-22 PROCEDURE — 85025 COMPLETE CBC W/AUTO DIFF WBC: CPT | Performed by: EMERGENCY MEDICINE

## 2019-10-22 PROCEDURE — 80053 COMPREHEN METABOLIC PANEL: CPT | Performed by: EMERGENCY MEDICINE

## 2019-10-22 PROCEDURE — 99284 EMERGENCY DEPT VISIT MOD MDM: CPT

## 2019-10-22 PROCEDURE — 99285 EMERGENCY DEPT VISIT HI MDM: CPT

## 2019-10-22 PROCEDURE — 96374 THER/PROPH/DIAG INJ IV PUSH: CPT

## 2019-10-22 RX ORDER — ONDANSETRON 2 MG/ML
4 INJECTION INTRAMUSCULAR; INTRAVENOUS ONCE
Status: COMPLETED | OUTPATIENT
Start: 2019-10-22 | End: 2019-10-22

## 2019-10-22 RX ORDER — MORPHINE SULFATE 4 MG/ML
4 INJECTION, SOLUTION INTRAMUSCULAR; INTRAVENOUS EVERY 30 MIN PRN
Status: DISCONTINUED | OUTPATIENT
Start: 2019-10-22 | End: 2019-10-23

## 2019-10-23 ENCOUNTER — APPOINTMENT (OUTPATIENT)
Dept: CT IMAGING | Facility: HOSPITAL | Age: 39
End: 2019-10-23
Attending: EMERGENCY MEDICINE
Payer: COMMERCIAL

## 2019-10-23 VITALS
HEIGHT: 72 IN | HEART RATE: 82 BPM | OXYGEN SATURATION: 98 % | BODY MASS INDEX: 26.01 KG/M2 | DIASTOLIC BLOOD PRESSURE: 72 MMHG | SYSTOLIC BLOOD PRESSURE: 110 MMHG | TEMPERATURE: 98 F | WEIGHT: 192 LBS | RESPIRATION RATE: 20 BRPM

## 2019-10-23 PROCEDURE — 87086 URINE CULTURE/COLONY COUNT: CPT | Performed by: EMERGENCY MEDICINE

## 2019-10-23 PROCEDURE — 81001 URINALYSIS AUTO W/SCOPE: CPT | Performed by: EMERGENCY MEDICINE

## 2019-10-23 PROCEDURE — 74176 CT ABD & PELVIS W/O CONTRAST: CPT | Performed by: EMERGENCY MEDICINE

## 2019-10-23 RX ORDER — HYDROCODONE BITARTRATE AND ACETAMINOPHEN 5; 325 MG/1; MG/1
1 TABLET ORAL EVERY 6 HOURS PRN
Qty: 14 TABLET | Refills: 0 | Status: SHIPPED | OUTPATIENT
Start: 2019-10-23 | End: 2019-10-29

## 2019-10-23 RX ORDER — CEPHALEXIN 500 MG/1
500 CAPSULE ORAL 4 TIMES DAILY
Qty: 28 CAPSULE | Refills: 0 | Status: SHIPPED | OUTPATIENT
Start: 2019-10-23 | End: 2019-10-30

## 2019-10-23 RX ORDER — TAMSULOSIN HYDROCHLORIDE 0.4 MG/1
0.4 CAPSULE ORAL DAILY
Qty: 7 CAPSULE | Refills: 0 | Status: SHIPPED | OUTPATIENT
Start: 2019-10-23 | End: 2019-10-29

## 2019-10-23 NOTE — ED PROVIDER NOTES
Patient Seen in: [facility] Emergency Department      History   Patient presents with:  Abdomen/Flank Pain (GI/)    Stated Complaint: left side abd pain with vomiting    HPI    25-year-old with a history of ulcerative colitis on azathioprine and E Exam    General: Patient is awake, alert in moderate painful acute distress. HEENT:   Sclera are not icteric. Conjunctivae within normal limits. Mucous members are mildly dry. Cardiovascular: Regular rate and rhythm, normal S1-S2.   Respiratory: Lungs medications. Feels comfortable with discharge home. Disposition and Plan     Clinical Impression:  Ureteral stone with hydronephrosis  (primary encounter diagnosis)    Disposition:  There is no disposition on file for this visit.   There is no

## 2019-10-23 NOTE — ED INITIAL ASSESSMENT (HPI)
A/O x 4. Patient presents with generalized abdominal pain. History of ulcerative colitis. Vomiting started today. Reports diarrhea, denies blood in stool.   Pain worsened today

## 2019-10-23 NOTE — PROGRESS NOTES
Results reviewed. Released to [facility]. Left  A  for pt to call back. Was in ER overnight with abd pain, found to have nephrolithiasis. Also with elevated fecal hamilton, started on Prednisone himself 30mg two days ago. WIll reassess sx and attempt to wean.  s

## 2019-10-23 NOTE — ED PROVIDER NOTES
Patient Seen in: [facility] Emergency Department      History   Patient presents with:  Abdomen/Flank Pain (GI/)    Stated Complaint: left side abd pain with vomiting    HPI    48-year-old with a history of ulcerative colitis on azathioprine and E Exam    General: Patient is awake, alert in moderate painful acute distress. HEENT:   Sclera are not icteric. Conjunctivae within normal limits. Mucous members are mildly dry. Cardiovascular: Regular rate and rhythm, normal S1-S2.   Respiratory: Lungs with 5-10 WBCs and blood but no other signs of infection, he is afebrile with a normal white blood cell count. I offered admission he declined would like to go home. I spoke with Dr. Kat hare from urology to arrange for close outpatient follow-up.   Pa

## 2019-11-08 ENCOUNTER — LAB ENCOUNTER (OUTPATIENT)
Dept: LAB | Facility: HOSPITAL | Age: 39
End: 2019-11-08
Attending: INTERNAL MEDICINE
Payer: COMMERCIAL

## 2019-11-08 DIAGNOSIS — K51.90 MILD CHRONIC ULCERATIVE COLITIS (HCC): Primary | ICD-10-CM

## 2019-11-08 DIAGNOSIS — N20.1 LEFT URETERAL STONE: ICD-10-CM

## 2019-11-08 PROCEDURE — 87086 URINE CULTURE/COLONY COUNT: CPT

## 2019-11-08 PROCEDURE — 80299 QUANTITATIVE ASSAY DRUG: CPT

## 2019-11-08 PROCEDURE — 82397 CHEMILUMINESCENT ASSAY: CPT

## 2020-01-20 PROBLEM — K51.90 ULCERATIVE COLITIS, UNSPECIFIED, WITHOUT COMPLICATIONS (HCC): Status: ACTIVE | Noted: 2020-01-20

## 2020-08-04 ENCOUNTER — LAB ENCOUNTER (OUTPATIENT)
Dept: LAB | Facility: HOSPITAL | Age: 40
End: 2020-08-04
Attending: NURSE PRACTITIONER
Payer: COMMERCIAL

## 2020-08-04 DIAGNOSIS — K51.80 OTHER ULCERATIVE COLITIS WITHOUT COMPLICATION (HCC): ICD-10-CM

## 2020-08-04 LAB
ALBUMIN SERPL-MCNC: 3.9 G/DL (ref 3.4–5)
ALBUMIN/GLOB SERPL: 1 {RATIO} (ref 1–2)
ALP LIVER SERPL-CCNC: 61 U/L (ref 45–117)
ALT SERPL-CCNC: 28 U/L (ref 16–61)
ANION GAP SERPL CALC-SCNC: <0 MMOL/L (ref 0–18)
AST SERPL-CCNC: 15 U/L (ref 15–37)
BASOPHILS # BLD AUTO: 0.05 X10(3) UL (ref 0–0.2)
BASOPHILS NFR BLD AUTO: 0.9 %
BILIRUB SERPL-MCNC: 0.8 MG/DL (ref 0.1–2)
BUN BLD-MCNC: 13 MG/DL (ref 7–18)
BUN/CREAT SERPL: 12.7 (ref 10–20)
CALCIUM BLD-MCNC: 9.2 MG/DL (ref 8.5–10.1)
CHLORIDE SERPL-SCNC: 106 MMOL/L (ref 98–112)
CO2 SERPL-SCNC: 32 MMOL/L (ref 21–32)
CREAT BLD-MCNC: 1.02 MG/DL (ref 0.7–1.3)
CRP SERPL-MCNC: <0.29 MG/DL (ref ?–0.3)
DEPRECATED RDW RBC AUTO: 45.5 FL (ref 35.1–46.3)
EOSINOPHIL # BLD AUTO: 0.37 X10(3) UL (ref 0–0.7)
EOSINOPHIL NFR BLD AUTO: 6.9 %
ERYTHROCYTE [DISTWIDTH] IN BLOOD BY AUTOMATED COUNT: 12.9 % (ref 11–15)
GLOBULIN PLAS-MCNC: 4.1 G/DL (ref 2.8–4.4)
GLUCOSE BLD-MCNC: 126 MG/DL (ref 70–99)
HCT VFR BLD AUTO: 41.5 % (ref 39–53)
HGB BLD-MCNC: 14.1 G/DL (ref 13–17.5)
IMM GRANULOCYTES # BLD AUTO: 0.02 X10(3) UL (ref 0–1)
IMM GRANULOCYTES NFR BLD: 0.4 %
LYMPHOCYTES # BLD AUTO: 1.85 X10(3) UL (ref 1–4)
LYMPHOCYTES NFR BLD AUTO: 34.6 %
M PROTEIN MFR SERPL ELPH: 8 G/DL (ref 6.4–8.2)
MCH RBC QN AUTO: 32.6 PG (ref 26–34)
MCHC RBC AUTO-ENTMCNC: 34 G/DL (ref 31–37)
MCV RBC AUTO: 95.8 FL (ref 80–100)
MONOCYTES # BLD AUTO: 0.44 X10(3) UL (ref 0.1–1)
MONOCYTES NFR BLD AUTO: 8.2 %
NEUTROPHILS # BLD AUTO: 2.62 X10 (3) UL (ref 1.5–7.7)
NEUTROPHILS # BLD AUTO: 2.62 X10(3) UL (ref 1.5–7.7)
NEUTROPHILS NFR BLD AUTO: 49 %
OSMOLALITY SERPL CALC.SUM OF ELEC: 286 MOSM/KG (ref 275–295)
PATIENT FASTING Y/N/NP: NO
PLATELET # BLD AUTO: 185 10(3)UL (ref 150–450)
POTASSIUM SERPL-SCNC: 3.7 MMOL/L (ref 3.5–5.1)
RBC # BLD AUTO: 4.33 X10(6)UL (ref 4.3–5.7)
SED RATE-ML: 53 MM/HR (ref 0–12)
SODIUM SERPL-SCNC: 137 MMOL/L (ref 136–145)
WBC # BLD AUTO: 5.4 X10(3) UL (ref 4–11)

## 2020-08-04 PROCEDURE — 86480 TB TEST CELL IMMUN MEASURE: CPT

## 2020-08-04 PROCEDURE — 80053 COMPREHEN METABOLIC PANEL: CPT

## 2020-08-04 PROCEDURE — 85652 RBC SED RATE AUTOMATED: CPT

## 2020-08-04 PROCEDURE — 85025 COMPLETE CBC W/AUTO DIFF WBC: CPT

## 2020-08-04 PROCEDURE — 36415 COLL VENOUS BLD VENIPUNCTURE: CPT

## 2020-08-04 PROCEDURE — 86140 C-REACTIVE PROTEIN: CPT

## 2020-08-05 DIAGNOSIS — M54.50 ACUTE LEFT-SIDED LOW BACK PAIN WITHOUT SCIATICA: ICD-10-CM

## 2020-08-06 LAB
M TB IFN-G CD4+ T-CELLS BLD-ACNC: 0.06 IU/ML
M TB TUBERC IFN-G BLD QL: NEGATIVE
M TB TUBERC IGNF/MITOGEN IGNF CONTROL: 9.14 IU/ML
QUANTIFERON TB1 MINUS NIL: -0.02 IU/ML
QUANTIFERON TB2 MINUS NIL: -0.02 IU/ML

## 2020-08-06 RX ORDER — DIAZEPAM 5 MG/1
5 TABLET ORAL EVERY 12 HOURS PRN
Qty: 14 TABLET | Refills: 0 | OUTPATIENT
Start: 2020-08-06

## 2020-10-06 ENCOUNTER — OFFICE VISIT (OUTPATIENT)
Dept: INTERNAL MEDICINE CLINIC | Facility: CLINIC | Age: 40
End: 2020-10-06
Payer: COMMERCIAL

## 2020-10-06 VITALS
WEIGHT: 200 LBS | SYSTOLIC BLOOD PRESSURE: 124 MMHG | DIASTOLIC BLOOD PRESSURE: 80 MMHG | HEIGHT: 71.5 IN | BODY MASS INDEX: 27.39 KG/M2 | OXYGEN SATURATION: 97 % | TEMPERATURE: 98 F | RESPIRATION RATE: 18 BRPM | HEART RATE: 78 BPM

## 2020-10-06 DIAGNOSIS — Z13.220 LIPID SCREENING: ICD-10-CM

## 2020-10-06 DIAGNOSIS — Z13.0 SCREENING, IRON DEFICIENCY ANEMIA: ICD-10-CM

## 2020-10-06 DIAGNOSIS — Z13.29 THYROID DISORDER SCREEN: ICD-10-CM

## 2020-10-06 DIAGNOSIS — Z00.00 LABORATORY EXAMINATION ORDERED AS PART OF A ROUTINE GENERAL MEDICAL EXAMINATION: ICD-10-CM

## 2020-10-06 DIAGNOSIS — Z00.00 ANNUAL PHYSICAL EXAM: Primary | ICD-10-CM

## 2020-10-06 DIAGNOSIS — Z12.5 PROSTATE CANCER SCREENING: ICD-10-CM

## 2020-10-06 DIAGNOSIS — Z23 NEED FOR INFLUENZA VACCINATION: ICD-10-CM

## 2020-10-06 DIAGNOSIS — Z13.89 SCREENING FOR GENITOURINARY CONDITION: ICD-10-CM

## 2020-10-06 DIAGNOSIS — S39.012A STRAIN OF LUMBAR REGION, INITIAL ENCOUNTER: ICD-10-CM

## 2020-10-06 PROBLEM — K51.011 ULCERATIVE CHRONIC PANCOLITIS WITH RECTAL BLEEDING (HCC): Status: RESOLVED | Noted: 2018-11-16 | Resolved: 2020-10-06

## 2020-10-06 PROCEDURE — 3074F SYST BP LT 130 MM HG: CPT | Performed by: INTERNAL MEDICINE

## 2020-10-06 PROCEDURE — 90686 IIV4 VACC NO PRSV 0.5 ML IM: CPT | Performed by: INTERNAL MEDICINE

## 2020-10-06 PROCEDURE — 3008F BODY MASS INDEX DOCD: CPT | Performed by: INTERNAL MEDICINE

## 2020-10-06 PROCEDURE — 99396 PREV VISIT EST AGE 40-64: CPT | Performed by: INTERNAL MEDICINE

## 2020-10-06 PROCEDURE — 99213 OFFICE O/P EST LOW 20 MIN: CPT | Performed by: INTERNAL MEDICINE

## 2020-10-06 PROCEDURE — 90471 IMMUNIZATION ADMIN: CPT | Performed by: INTERNAL MEDICINE

## 2020-10-06 PROCEDURE — 3079F DIAST BP 80-89 MM HG: CPT | Performed by: INTERNAL MEDICINE

## 2020-10-06 RX ORDER — CYCLOBENZAPRINE HCL 10 MG
10 TABLET ORAL NIGHTLY
Qty: 7 TABLET | Refills: 0 | Status: SHIPPED | OUTPATIENT
Start: 2020-10-06 | End: 2021-08-31 | Stop reason: ALTCHOICE

## 2020-10-06 RX ORDER — NAPROXEN 500 MG/1
500 TABLET ORAL 2 TIMES DAILY WITH MEALS
Qty: 20 TABLET | Refills: 0 | Status: SHIPPED | OUTPATIENT
Start: 2020-10-06 | End: 2021-01-28

## 2020-10-06 NOTE — PROGRESS NOTES
HPI:    Patient ID: Tonny Jimenez is a 38year old male. Back Pain      Rolando Gonzalez is a 38year old male who presents for a complete physical exam.   HPI:   Pt complains of left sided back pain for few weeks. Stable. No hx of trauma.  No radiation to 500 MG Oral Tab Take 1 tablet (500 mg total) by mouth 2 (two) times daily with meals.  20 tablet 0   • azathioprine 50 MG Oral Tab TAKE 3 AND 1/2 TABLETS BY MOUTH ONCE DAILY 315 tablet 2      Past Medical History:   Diagnosis Date   • UC (ulcerative colitis deferred  MUSCULOSKELETAL: back is not tender,FROM of the back  EXTREMITIES: no cyanosis, clubbing or edema  NEURO: Oriented times three,motor and sensory are grossly intact    ASSESSMENT AND PLAN:   Alpesh Vazquez is a 38year old male who presents for a c Flulaval 6 months and older 0.5 ml PFS [[id number]]      Meds This Visit:  Requested Prescriptions     Signed Prescriptions Disp Refills   • cyclobenzaprine 10 MG Oral Tab 7 tablet 0     Sig: Take 1 tablet (10 mg total) by mouth nightly.    • naproxen 500 MG Ora

## 2020-10-06 NOTE — PATIENT INSTRUCTIONS
Back Sprain or Strain     Injury to the muscles (strain) or ligaments (sprain) around the spine can be troubling.  Injury may occur after a sudden forceful twisting or bending such as in a car accident, after a simple awkward movement, or after lifting so · You can alternate the ice and heat. Talk with your healthcare provider to find out the best treatment or therapy for your back pain. · Therapeutic massage can help relax the back muscles without stretching them. · Be aware of safe lifting methods.  Farhan Mcarthur Call your healthcare provider right away if any of the following occur:  · Pain gets worse or spreads to your arms or legs  · Weakness or numbness in one or both arms or legs  · Numbness in the groin or genital area  Seda last reviewed this educational

## 2020-10-21 ENCOUNTER — APPOINTMENT (OUTPATIENT)
Dept: LAB | Facility: HOSPITAL | Age: 40
End: 2020-10-21
Attending: INTERNAL MEDICINE
Payer: COMMERCIAL

## 2020-10-21 DIAGNOSIS — K51.00 ULCERATIVE PANCOLITIS WITHOUT COMPLICATION (HCC): ICD-10-CM

## 2020-10-21 DIAGNOSIS — R53.83 FATIGUE, UNSPECIFIED TYPE: ICD-10-CM

## 2020-10-26 ENCOUNTER — EKG ENCOUNTER (OUTPATIENT)
Dept: LAB | Facility: HOSPITAL | Age: 40
End: 2020-10-26
Attending: INTERNAL MEDICINE
Payer: COMMERCIAL

## 2020-10-26 DIAGNOSIS — K51.00 ULCERATIVE PANCOLITIS WITHOUT COMPLICATION (HCC): ICD-10-CM

## 2020-10-26 DIAGNOSIS — Z51.81 THERAPEUTIC DRUG MONITORING: ICD-10-CM

## 2020-10-26 DIAGNOSIS — R53.83 FATIGUE, UNSPECIFIED TYPE: ICD-10-CM

## 2020-10-26 PROCEDURE — 83993 ASSAY FOR CALPROTECTIN FECAL: CPT

## 2020-10-26 PROCEDURE — 85652 RBC SED RATE AUTOMATED: CPT

## 2020-10-26 PROCEDURE — 86140 C-REACTIVE PROTEIN: CPT

## 2020-10-26 PROCEDURE — 85025 COMPLETE CBC W/AUTO DIFF WBC: CPT

## 2020-10-26 PROCEDURE — 36415 COLL VENOUS BLD VENIPUNCTURE: CPT

## 2020-10-26 PROCEDURE — 80230 DRUG ASSAY INFLIXIMAB: CPT

## 2020-10-26 PROCEDURE — 80053 COMPREHEN METABOLIC PANEL: CPT

## 2020-10-26 PROCEDURE — 82397 CHEMILUMINESCENT ASSAY: CPT

## 2020-10-26 PROCEDURE — 80299 QUANTITATIVE ASSAY DRUG: CPT

## 2020-10-27 ENCOUNTER — LAB ENCOUNTER (OUTPATIENT)
Dept: LAB | Facility: HOSPITAL | Age: 40
End: 2020-10-27
Attending: INTERNAL MEDICINE
Payer: COMMERCIAL

## 2020-10-27 DIAGNOSIS — Z13.29 THYROID DISORDER SCREEN: ICD-10-CM

## 2020-10-27 DIAGNOSIS — Z13.0 SCREENING, IRON DEFICIENCY ANEMIA: ICD-10-CM

## 2020-10-27 DIAGNOSIS — Z13.220 LIPID SCREENING: ICD-10-CM

## 2020-10-27 DIAGNOSIS — Z13.89 SCREENING FOR GENITOURINARY CONDITION: ICD-10-CM

## 2020-10-27 DIAGNOSIS — Z00.00 LABORATORY EXAMINATION ORDERED AS PART OF A ROUTINE GENERAL MEDICAL EXAMINATION: ICD-10-CM

## 2020-10-27 PROCEDURE — 80053 COMPREHEN METABOLIC PANEL: CPT

## 2020-10-27 PROCEDURE — 81003 URINALYSIS AUTO W/O SCOPE: CPT

## 2020-10-27 PROCEDURE — 36415 COLL VENOUS BLD VENIPUNCTURE: CPT

## 2020-10-27 PROCEDURE — 85025 COMPLETE CBC W/AUTO DIFF WBC: CPT

## 2020-10-27 PROCEDURE — 80061 LIPID PANEL: CPT

## 2020-10-27 PROCEDURE — 83036 HEMOGLOBIN GLYCOSYLATED A1C: CPT

## 2020-10-27 PROCEDURE — 84443 ASSAY THYROID STIM HORMONE: CPT

## 2021-01-26 ENCOUNTER — LAB ENCOUNTER (OUTPATIENT)
Dept: LAB | Facility: HOSPITAL | Age: 41
End: 2021-01-26
Attending: INTERNAL MEDICINE
Payer: COMMERCIAL

## 2021-01-26 DIAGNOSIS — Z01.818 PRE-OP TESTING: ICD-10-CM

## 2021-01-27 LAB — SARS-COV-2 RNA RESP QL NAA+PROBE: NOT DETECTED

## 2021-01-29 PROBLEM — K51.00 ULCERATIVE CHRONIC PANCOLITIS WITHOUT COMPLICATIONS (HCC): Status: ACTIVE | Noted: 2021-01-29

## 2021-02-17 ENCOUNTER — TELEPHONE (OUTPATIENT)
Dept: INTERNAL MEDICINE CLINIC | Facility: CLINIC | Age: 41
End: 2021-02-17

## 2021-02-17 NOTE — TELEPHONE ENCOUNTER
Pt report per His GI doctor requires shingles vaccine prior to  [place] medication  Order for shingles faxed to indicated pharmacy at [phone]

## 2021-02-17 NOTE — TELEPHONE ENCOUNTER
Pt calling requesting an order for a shingles vaccine be sent in to his requested pharmacy. Pt states he needs to have a shingles vaccine before switching to a new medication called Leticia Viveros.  Pt requesting this order be sent to [facility]

## 2021-02-24 ENCOUNTER — TELEPHONE (OUTPATIENT)
Dept: INTERNAL MEDICINE CLINIC | Facility: CLINIC | Age: 41
End: 2021-02-24

## 2021-02-24 DIAGNOSIS — Z23 NEED FOR SHINGLES VACCINE: Primary | ICD-10-CM

## 2021-02-24 RX ORDER — ZOSTER VACCINE RECOMBINANT, ADJUVANTED 50 MCG/0.5
0.5 KIT INTRAMUSCULAR ONCE
Qty: 2 EACH | Refills: 0 | Status: SHIPPED | OUTPATIENT
Start: 2021-02-24 | End: 2021-02-24

## 2021-02-24 RX ORDER — ZOSTER VACCINE RECOMBINANT, ADJUVANTED 50 MCG/0.5
0.5 KIT INTRAMUSCULAR ONCE
Qty: 1 EACH | Refills: 0 | Status: SHIPPED | OUTPATIENT
Start: 2021-04-24 | End: 2021-04-24

## 2021-02-24 NOTE — TELEPHONE ENCOUNTER
[facility] requesting we send over an RX for Shingrix, order placed and sent.

## 2021-02-24 NOTE — TELEPHONE ENCOUNTER
Order for Shingles vaccine re-faxed to number provided by pt [phone]-[facility]. Pt notified via phone.

## 2021-02-24 NOTE — TELEPHONE ENCOUNTER
Patient called in regarding request made on 02/17/2021 for shingles vaccine. Patient stated that the [facility] pharmacy never received the order for Shingles vaccine. Patient requesting order be re-faxed to [phone].  Patient requesting a call back once compl

## 2021-02-24 NOTE — TELEPHONE ENCOUNTER
Patient called in stating he received a call from Rn inquiring the [facility] address for shingles order. Advised patient that note from Rn shows order was sent.  Patient stated he would call [facility] to confirm that order was received and provided the [facility] address if s

## 2021-03-09 ENCOUNTER — LAB ENCOUNTER (OUTPATIENT)
Dept: LAB | Facility: HOSPITAL | Age: 41
End: 2021-03-09
Attending: INTERNAL MEDICINE
Payer: COMMERCIAL

## 2021-03-09 DIAGNOSIS — K51.00 ULCERATIVE PANCOLITIS (HCC): ICD-10-CM

## 2021-03-09 DIAGNOSIS — Z51.81 THERAPEUTIC DRUG MONITORING: ICD-10-CM

## 2021-03-09 DIAGNOSIS — K51.00 ULCERATIVE PANCOLITIS WITHOUT COMPLICATION (HCC): ICD-10-CM

## 2021-03-09 LAB
ALBUMIN SERPL-MCNC: 3.7 G/DL (ref 3.4–5)
ALBUMIN/GLOB SERPL: 0.9 {RATIO} (ref 1–2)
ALP LIVER SERPL-CCNC: 73 U/L
ALT SERPL-CCNC: 23 U/L
ANION GAP SERPL CALC-SCNC: 1 MMOL/L (ref 0–18)
AST SERPL-CCNC: 15 U/L (ref 15–37)
BASOPHILS # BLD AUTO: 0.06 X10(3) UL (ref 0–0.2)
BASOPHILS NFR BLD AUTO: 0.8 %
BILIRUB SERPL-MCNC: 0.6 MG/DL (ref 0.1–2)
BUN BLD-MCNC: 15 MG/DL (ref 7–18)
BUN/CREAT SERPL: 14.3 (ref 10–20)
CALCIUM BLD-MCNC: 9.1 MG/DL (ref 8.5–10.1)
CHLORIDE SERPL-SCNC: 106 MMOL/L (ref 98–112)
CHOLEST SMN-MCNC: 160 MG/DL (ref ?–200)
CO2 SERPL-SCNC: 32 MMOL/L (ref 21–32)
CREAT BLD-MCNC: 1.05 MG/DL
DEPRECATED RDW RBC AUTO: 42.4 FL (ref 35.1–46.3)
EOSINOPHIL # BLD AUTO: 0.32 X10(3) UL (ref 0–0.7)
EOSINOPHIL NFR BLD AUTO: 4.5 %
ERYTHROCYTE [DISTWIDTH] IN BLOOD BY AUTOMATED COUNT: 12.4 % (ref 11–15)
GLOBULIN PLAS-MCNC: 4.1 G/DL (ref 2.8–4.4)
GLUCOSE BLD-MCNC: 89 MG/DL (ref 70–99)
HCT VFR BLD AUTO: 43.9 %
HDLC SERPL-MCNC: 50 MG/DL (ref 40–59)
HGB BLD-MCNC: 14.7 G/DL
IMM GRANULOCYTES # BLD AUTO: 0.05 X10(3) UL (ref 0–1)
IMM GRANULOCYTES NFR BLD: 0.7 %
LDLC SERPL CALC-MCNC: 91 MG/DL (ref ?–100)
LYMPHOCYTES # BLD AUTO: 2.94 X10(3) UL (ref 1–4)
LYMPHOCYTES NFR BLD AUTO: 41.1 %
M PROTEIN MFR SERPL ELPH: 7.8 G/DL (ref 6.4–8.2)
MCH RBC QN AUTO: 31.3 PG (ref 26–34)
MCHC RBC AUTO-ENTMCNC: 33.5 G/DL (ref 31–37)
MCV RBC AUTO: 93.6 FL
MONOCYTES # BLD AUTO: 0.68 X10(3) UL (ref 0.1–1)
MONOCYTES NFR BLD AUTO: 9.5 %
NEUTROPHILS # BLD AUTO: 3.1 X10 (3) UL (ref 1.5–7.7)
NEUTROPHILS # BLD AUTO: 3.1 X10(3) UL (ref 1.5–7.7)
NEUTROPHILS NFR BLD AUTO: 43.4 %
NONHDLC SERPL-MCNC: 110 MG/DL (ref ?–130)
OSMOLALITY SERPL CALC.SUM OF ELEC: 288 MOSM/KG (ref 275–295)
PATIENT FASTING Y/N/NP: YES
PATIENT FASTING Y/N/NP: YES
PLATELET # BLD AUTO: 188 10(3)UL (ref 150–450)
POTASSIUM SERPL-SCNC: 4 MMOL/L (ref 3.5–5.1)
RBC # BLD AUTO: 4.69 X10(6)UL
SODIUM SERPL-SCNC: 139 MMOL/L (ref 136–145)
TRIGL SERPL-MCNC: 96 MG/DL (ref 30–149)
VLDLC SERPL CALC-MCNC: 19 MG/DL (ref 0–30)
WBC # BLD AUTO: 7.2 X10(3) UL (ref 4–11)

## 2021-03-09 PROCEDURE — 80061 LIPID PANEL: CPT

## 2021-03-09 PROCEDURE — 80053 COMPREHEN METABOLIC PANEL: CPT

## 2021-03-09 PROCEDURE — 36415 COLL VENOUS BLD VENIPUNCTURE: CPT

## 2021-03-09 PROCEDURE — 85025 COMPLETE CBC W/AUTO DIFF WBC: CPT

## 2021-03-30 ENCOUNTER — LAB ENCOUNTER (OUTPATIENT)
Dept: LAB | Facility: HOSPITAL | Age: 41
End: 2021-03-30
Attending: INTERNAL MEDICINE
Payer: COMMERCIAL

## 2021-03-30 DIAGNOSIS — K51.00 ULCERATIVE PANCOLITIS WITHOUT COMPLICATION (HCC): ICD-10-CM

## 2021-03-30 DIAGNOSIS — Z51.81 THERAPEUTIC DRUG MONITORING: ICD-10-CM

## 2021-03-30 DIAGNOSIS — K51.00 ULCERATIVE PANCOLITIS (HCC): ICD-10-CM

## 2021-03-30 PROCEDURE — 80061 LIPID PANEL: CPT

## 2021-03-30 PROCEDURE — 80053 COMPREHEN METABOLIC PANEL: CPT

## 2021-03-30 PROCEDURE — 36415 COLL VENOUS BLD VENIPUNCTURE: CPT

## 2021-03-30 PROCEDURE — 85025 COMPLETE CBC W/AUTO DIFF WBC: CPT

## 2021-04-06 ENCOUNTER — LAB ENCOUNTER (OUTPATIENT)
Dept: LAB | Facility: HOSPITAL | Age: 41
End: 2021-04-06
Attending: INTERNAL MEDICINE
Payer: COMMERCIAL

## 2021-04-06 DIAGNOSIS — K51.00 ULCERATIVE PANCOLITIS WITHOUT COMPLICATION (HCC): ICD-10-CM

## 2021-04-06 PROCEDURE — 36415 COLL VENOUS BLD VENIPUNCTURE: CPT

## 2021-04-06 PROCEDURE — 85025 COMPLETE CBC W/AUTO DIFF WBC: CPT

## 2021-04-06 PROCEDURE — 80053 COMPREHEN METABOLIC PANEL: CPT

## 2021-04-26 ENCOUNTER — IMMUNIZATION (OUTPATIENT)
Dept: LAB | Facility: HOSPITAL | Age: 41
End: 2021-04-26
Attending: EMERGENCY MEDICINE
Payer: COMMERCIAL

## 2021-04-26 DIAGNOSIS — Z23 NEED FOR VACCINATION: Primary | ICD-10-CM

## 2021-04-26 PROCEDURE — 0012A SARSCOV2 VAC 100MCG/0.5ML IM: CPT

## 2021-06-02 ENCOUNTER — LAB ENCOUNTER (OUTPATIENT)
Dept: LAB | Facility: HOSPITAL | Age: 41
End: 2021-06-02
Attending: INTERNAL MEDICINE
Payer: COMMERCIAL

## 2021-06-02 DIAGNOSIS — K51.00 ULCERATIVE PANCOLITIS WITHOUT COMPLICATION (HCC): ICD-10-CM

## 2021-06-02 PROCEDURE — 36415 COLL VENOUS BLD VENIPUNCTURE: CPT

## 2021-06-02 PROCEDURE — 80053 COMPREHEN METABOLIC PANEL: CPT

## 2021-06-02 PROCEDURE — 85025 COMPLETE CBC W/AUTO DIFF WBC: CPT

## 2021-09-16 ENCOUNTER — LAB ENCOUNTER (OUTPATIENT)
Dept: LAB | Facility: HOSPITAL | Age: 41
End: 2021-09-16
Attending: INTERNAL MEDICINE
Payer: COMMERCIAL

## 2021-09-16 DIAGNOSIS — K51.00 ULCERATIVE PANCOLITIS WITHOUT COMPLICATION (HCC): ICD-10-CM

## 2021-09-16 LAB
ALBUMIN SERPL-MCNC: 4 G/DL (ref 3.4–5)
ALBUMIN/GLOB SERPL: 1 {RATIO} (ref 1–2)
ALP LIVER SERPL-CCNC: 53 U/L
ALT SERPL-CCNC: 25 U/L
ANION GAP SERPL CALC-SCNC: 0 MMOL/L (ref 0–18)
AST SERPL-CCNC: 23 U/L (ref 15–37)
BASOPHILS # BLD AUTO: 0.05 X10(3) UL (ref 0–0.2)
BASOPHILS NFR BLD AUTO: 0.8 %
BILIRUB SERPL-MCNC: 0.9 MG/DL (ref 0.1–2)
BUN BLD-MCNC: 19 MG/DL (ref 7–18)
CALCIUM BLD-MCNC: 9.1 MG/DL (ref 8.5–10.1)
CHLORIDE SERPL-SCNC: 107 MMOL/L (ref 98–112)
CHOLEST SERPL-MCNC: 236 MG/DL (ref ?–200)
CO2 SERPL-SCNC: 30 MMOL/L (ref 21–32)
CREAT BLD-MCNC: 1.13 MG/DL
EOSINOPHIL # BLD AUTO: 0.21 X10(3) UL (ref 0–0.7)
EOSINOPHIL NFR BLD AUTO: 3.3 %
ERYTHROCYTE [DISTWIDTH] IN BLOOD BY AUTOMATED COUNT: 12.2 %
GLOBULIN PLAS-MCNC: 4.1 G/DL (ref 2.8–4.4)
GLUCOSE BLD-MCNC: 78 MG/DL (ref 70–99)
HCT VFR BLD AUTO: 42.7 %
HDLC SERPL-MCNC: 53 MG/DL (ref 40–59)
HGB BLD-MCNC: 14.5 G/DL
IMM GRANULOCYTES # BLD AUTO: 0.03 X10(3) UL (ref 0–1)
IMM GRANULOCYTES NFR BLD: 0.5 %
LDLC SERPL CALC-MCNC: 141 MG/DL (ref ?–100)
LYMPHOCYTES # BLD AUTO: 2.81 X10(3) UL (ref 1–4)
LYMPHOCYTES NFR BLD AUTO: 44.1 %
MCH RBC QN AUTO: 30.7 PG (ref 26–34)
MCHC RBC AUTO-ENTMCNC: 34 G/DL (ref 31–37)
MCV RBC AUTO: 90.3 FL
MONOCYTES # BLD AUTO: 0.65 X10(3) UL (ref 0.1–1)
MONOCYTES NFR BLD AUTO: 10.2 %
NEUTROPHILS # BLD AUTO: 2.62 X10 (3) UL (ref 1.5–7.7)
NEUTROPHILS # BLD AUTO: 2.62 X10(3) UL (ref 1.5–7.7)
NEUTROPHILS NFR BLD AUTO: 41.1 %
NONHDLC SERPL-MCNC: 183 MG/DL (ref ?–130)
OSMOLALITY SERPL CALC.SUM OF ELEC: 285 MOSM/KG (ref 275–295)
PATIENT FASTING Y/N/NP: NO
PATIENT FASTING Y/N/NP: NO
PLATELET # BLD AUTO: 180 10(3)UL (ref 150–450)
POTASSIUM SERPL-SCNC: 4.1 MMOL/L (ref 3.5–5.1)
PROT SERPL-MCNC: 8.1 G/DL (ref 6.4–8.2)
RBC # BLD AUTO: 4.73 X10(6)UL
SODIUM SERPL-SCNC: 137 MMOL/L (ref 136–145)
TRIGL SERPL-MCNC: 235 MG/DL (ref 30–149)
VLDLC SERPL CALC-MCNC: 44 MG/DL (ref 0–30)
WBC # BLD AUTO: 6.4 X10(3) UL (ref 4–11)

## 2021-09-16 PROCEDURE — 36415 COLL VENOUS BLD VENIPUNCTURE: CPT

## 2021-09-16 PROCEDURE — 80053 COMPREHEN METABOLIC PANEL: CPT

## 2021-09-16 PROCEDURE — 80061 LIPID PANEL: CPT

## 2021-09-16 PROCEDURE — 85025 COMPLETE CBC W/AUTO DIFF WBC: CPT

## 2021-09-24 ENCOUNTER — LAB ENCOUNTER (OUTPATIENT)
Dept: LAB | Facility: HOSPITAL | Age: 41
End: 2021-09-24
Attending: INTERNAL MEDICINE
Payer: COMMERCIAL

## 2021-09-24 DIAGNOSIS — E78.2 MODERATE MIXED HYPERLIPIDEMIA NOT REQUIRING STATIN THERAPY: ICD-10-CM

## 2021-09-24 LAB
CHOLEST SERPL-MCNC: 195 MG/DL (ref ?–200)
HDLC SERPL-MCNC: 51 MG/DL (ref 40–59)
LDLC SERPL CALC-MCNC: 129 MG/DL (ref ?–100)
NONHDLC SERPL-MCNC: 144 MG/DL (ref ?–130)
PATIENT FASTING Y/N/NP: YES
TRIGL SERPL-MCNC: 83 MG/DL (ref 30–149)
VLDLC SERPL CALC-MCNC: 15 MG/DL (ref 0–30)

## 2021-09-24 PROCEDURE — 36415 COLL VENOUS BLD VENIPUNCTURE: CPT

## 2021-09-24 PROCEDURE — 80061 LIPID PANEL: CPT

## 2021-10-01 ENCOUNTER — LAB ENCOUNTER (OUTPATIENT)
Dept: LAB | Facility: HOSPITAL | Age: 41
End: 2021-10-01
Attending: INTERNAL MEDICINE
Payer: COMMERCIAL

## 2021-10-01 DIAGNOSIS — K51.00 ULCERATIVE PANCOLITIS WITHOUT COMPLICATION (HCC): ICD-10-CM

## 2021-10-01 PROCEDURE — 83993 ASSAY FOR CALPROTECTIN FECAL: CPT

## 2021-11-21 ENCOUNTER — LAB ENCOUNTER (OUTPATIENT)
Dept: LAB | Facility: HOSPITAL | Age: 41
End: 2021-11-21
Attending: EMERGENCY MEDICINE
Payer: COMMERCIAL

## 2021-11-21 DIAGNOSIS — Z01.818 PRE-OP TESTING: ICD-10-CM

## 2021-12-12 ENCOUNTER — IMMUNIZATION (OUTPATIENT)
Dept: LAB | Facility: HOSPITAL | Age: 41
End: 2021-12-12
Attending: EMERGENCY MEDICINE
Payer: COMMERCIAL

## 2021-12-12 DIAGNOSIS — Z23 NEED FOR VACCINATION: Primary | ICD-10-CM

## 2021-12-12 PROCEDURE — 0064A SARSCOV2 VAC 50MCG/0.25ML IM: CPT

## 2022-03-10 ENCOUNTER — LAB ENCOUNTER (OUTPATIENT)
Dept: LAB | Facility: HOSPITAL | Age: 42
End: 2022-03-10
Attending: INTERNAL MEDICINE
Payer: COMMERCIAL

## 2022-03-10 DIAGNOSIS — K51.00 ULCERATIVE PANCOLITIS WITHOUT COMPLICATION (HCC): ICD-10-CM

## 2022-03-10 LAB
ALBUMIN SERPL-MCNC: 4 G/DL (ref 3.4–5)
ALBUMIN/GLOB SERPL: 1.1 {RATIO} (ref 1–2)
ALP LIVER SERPL-CCNC: 55 U/L
ALT SERPL-CCNC: 26 U/L
ANION GAP SERPL CALC-SCNC: 3 MMOL/L (ref 0–18)
AST SERPL-CCNC: 19 U/L (ref 15–37)
BASOPHILS # BLD AUTO: 0.04 X10(3) UL (ref 0–0.2)
BASOPHILS NFR BLD AUTO: 0.7 %
BILIRUB SERPL-MCNC: 0.8 MG/DL (ref 0.1–2)
CALCIUM BLD-MCNC: 9.1 MG/DL (ref 8.5–10.1)
CHLORIDE SERPL-SCNC: 108 MMOL/L (ref 98–112)
CO2 SERPL-SCNC: 28 MMOL/L (ref 21–32)
CREAT BLD-MCNC: 1 MG/DL
EOSINOPHIL # BLD AUTO: 0.21 X10(3) UL (ref 0–0.7)
EOSINOPHIL NFR BLD AUTO: 3.9 %
ERYTHROCYTE [DISTWIDTH] IN BLOOD BY AUTOMATED COUNT: 12.1 %
FASTING STATUS PATIENT QL REPORTED: YES
GLOBULIN PLAS-MCNC: 3.5 G/DL (ref 2.8–4.4)
GLUCOSE BLD-MCNC: 93 MG/DL (ref 70–99)
HCT VFR BLD AUTO: 43.5 %
HGB BLD-MCNC: 14.3 G/DL
IMM GRANULOCYTES # BLD AUTO: 0.03 X10(3) UL (ref 0–1)
IMM GRANULOCYTES NFR BLD: 0.6 %
LYMPHOCYTES # BLD AUTO: 1.9 X10(3) UL (ref 1–4)
LYMPHOCYTES NFR BLD AUTO: 35.4 %
MCH RBC QN AUTO: 29.5 PG (ref 26–34)
MCHC RBC AUTO-ENTMCNC: 32.9 G/DL (ref 31–37)
MCV RBC AUTO: 89.7 FL
MONOCYTES # BLD AUTO: 0.5 X10(3) UL (ref 0.1–1)
MONOCYTES NFR BLD AUTO: 9.3 %
NEUTROPHILS # BLD AUTO: 2.69 X10 (3) UL (ref 1.5–7.7)
NEUTROPHILS # BLD AUTO: 2.69 X10(3) UL (ref 1.5–7.7)
NEUTROPHILS NFR BLD AUTO: 50.1 %
OSMOLALITY SERPL CALC.SUM OF ELEC: 289 MOSM/KG (ref 275–295)
PLATELET # BLD AUTO: 172 10(3)UL (ref 150–450)
POTASSIUM SERPL-SCNC: 4.5 MMOL/L (ref 3.5–5.1)
PROT SERPL-MCNC: 7.5 G/DL (ref 6.4–8.2)
RBC # BLD AUTO: 4.85 X10(6)UL
SODIUM SERPL-SCNC: 139 MMOL/L (ref 136–145)
WBC # BLD AUTO: 5.4 X10(3) UL (ref 4–11)

## 2022-03-10 PROCEDURE — 85025 COMPLETE CBC W/AUTO DIFF WBC: CPT

## 2022-03-10 PROCEDURE — 80053 COMPREHEN METABOLIC PANEL: CPT

## 2022-03-10 PROCEDURE — 36415 COLL VENOUS BLD VENIPUNCTURE: CPT

## 2022-07-05 ENCOUNTER — HOSPITAL ENCOUNTER (OUTPATIENT)
Dept: BONE DENSITY | Age: 42
Discharge: HOME | End: 2022-07-05
Attending: INTERNAL MEDICINE
Payer: COMMERCIAL

## 2022-07-05 DIAGNOSIS — K51.00 ULCERATIVE PANCOLITIS WITHOUT COMPLICATION (HCC): ICD-10-CM

## 2022-07-05 PROCEDURE — 77080 DXA BONE DENSITY AXIAL: CPT | Performed by: INTERNAL MEDICINE

## 2022-07-06 DIAGNOSIS — Z13.21 ENCOUNTER FOR VITAMIN DEFICIENCY SCREENING: Primary | ICD-10-CM

## 2022-07-11 ENCOUNTER — LAB ENCOUNTER (OUTPATIENT)
Dept: LAB | Facility: HOSPITAL | Age: 42
End: 2022-07-11
Attending: NURSE PRACTITIONER
Payer: COMMERCIAL

## 2022-07-11 DIAGNOSIS — K51.00 ULCERATIVE PANCOLITIS WITHOUT COMPLICATION (HCC): ICD-10-CM

## 2022-07-11 DIAGNOSIS — Z13.21 ENCOUNTER FOR VITAMIN DEFICIENCY SCREENING: ICD-10-CM

## 2022-07-11 LAB — VIT D+METAB SERPL-MCNC: 24.4 NG/ML (ref 30–100)

## 2022-07-11 PROCEDURE — 82306 VITAMIN D 25 HYDROXY: CPT

## 2022-07-11 PROCEDURE — 36415 COLL VENOUS BLD VENIPUNCTURE: CPT

## 2022-07-11 PROCEDURE — 83993 ASSAY FOR CALPROTECTIN FECAL: CPT

## 2022-07-14 LAB — CALPROTECTIN, FECAL: 50 UG/G

## 2022-12-18 ENCOUNTER — LAB ENCOUNTER (OUTPATIENT)
Dept: LAB | Facility: HOSPITAL | Age: 42
End: 2022-12-18
Attending: INTERNAL MEDICINE
Payer: COMMERCIAL

## 2022-12-18 DIAGNOSIS — K51.00 ULCERATIVE PANCOLITIS WITHOUT COMPLICATION (HCC): ICD-10-CM

## 2022-12-18 LAB
ALBUMIN SERPL-MCNC: 4.1 G/DL (ref 3.4–5)
ALBUMIN/GLOB SERPL: 1.2 {RATIO} (ref 1–2)
ALP LIVER SERPL-CCNC: 53 U/L
ALT SERPL-CCNC: 21 U/L
ANION GAP SERPL CALC-SCNC: 0 MMOL/L (ref 0–18)
AST SERPL-CCNC: 19 U/L (ref 15–37)
BASOPHILS # BLD AUTO: 0.05 X10(3) UL (ref 0–0.2)
BASOPHILS NFR BLD AUTO: 0.8 %
BILIRUB SERPL-MCNC: 0.7 MG/DL (ref 0.1–2)
BUN BLD-MCNC: 13 MG/DL (ref 7–18)
CALCIUM BLD-MCNC: 9.4 MG/DL (ref 8.5–10.1)
CHLORIDE SERPL-SCNC: 106 MMOL/L (ref 98–112)
CO2 SERPL-SCNC: 32 MMOL/L (ref 21–32)
CREAT BLD-MCNC: 1.1 MG/DL
EOSINOPHIL # BLD AUTO: 0.31 X10(3) UL (ref 0–0.7)
EOSINOPHIL NFR BLD AUTO: 4.8 %
ERYTHROCYTE [DISTWIDTH] IN BLOOD BY AUTOMATED COUNT: 12.5 %
FASTING STATUS PATIENT QL REPORTED: YES
GFR SERPLBLD BASED ON 1.73 SQ M-ARVRAT: 85 ML/MIN/1.73M2 (ref 60–?)
GLOBULIN PLAS-MCNC: 3.4 G/DL (ref 2.8–4.4)
GLUCOSE BLD-MCNC: 82 MG/DL (ref 70–99)
HCT VFR BLD AUTO: 41.2 %
HGB BLD-MCNC: 14 G/DL
IMM GRANULOCYTES # BLD AUTO: 0.02 X10(3) UL (ref 0–1)
IMM GRANULOCYTES NFR BLD: 0.3 %
LYMPHOCYTES # BLD AUTO: 3.49 X10(3) UL (ref 1–4)
LYMPHOCYTES NFR BLD AUTO: 54.2 %
MCH RBC QN AUTO: 30.5 PG (ref 26–34)
MCHC RBC AUTO-ENTMCNC: 34 G/DL (ref 31–37)
MCV RBC AUTO: 89.8 FL
MONOCYTES # BLD AUTO: 0.66 X10(3) UL (ref 0.1–1)
MONOCYTES NFR BLD AUTO: 10.2 %
NEUTROPHILS # BLD AUTO: 1.91 X10 (3) UL (ref 1.5–7.7)
NEUTROPHILS # BLD AUTO: 1.91 X10(3) UL (ref 1.5–7.7)
NEUTROPHILS NFR BLD AUTO: 29.7 %
OSMOLALITY SERPL CALC.SUM OF ELEC: 285 MOSM/KG (ref 275–295)
PLATELET # BLD AUTO: 193 10(3)UL (ref 150–450)
POTASSIUM SERPL-SCNC: 3.6 MMOL/L (ref 3.5–5.1)
PROT SERPL-MCNC: 7.5 G/DL (ref 6.4–8.2)
RBC # BLD AUTO: 4.59 X10(6)UL
SODIUM SERPL-SCNC: 138 MMOL/L (ref 136–145)
WBC # BLD AUTO: 6.4 X10(3) UL (ref 4–11)

## 2022-12-18 PROCEDURE — 80053 COMPREHEN METABOLIC PANEL: CPT

## 2022-12-18 PROCEDURE — 36415 COLL VENOUS BLD VENIPUNCTURE: CPT

## 2022-12-18 PROCEDURE — 85025 COMPLETE CBC W/AUTO DIFF WBC: CPT

## 2023-04-11 ENCOUNTER — APPOINTMENT (OUTPATIENT)
Dept: CT IMAGING | Age: 43
End: 2023-04-11
Attending: EMERGENCY MEDICINE
Payer: COMMERCIAL

## 2023-04-11 ENCOUNTER — HOSPITAL ENCOUNTER (EMERGENCY)
Age: 43
Discharge: HOME | End: 2023-04-11
Attending: EMERGENCY MEDICINE
Payer: COMMERCIAL

## 2023-04-11 VITALS
WEIGHT: 193 LBS | DIASTOLIC BLOOD PRESSURE: 78 MMHG | SYSTOLIC BLOOD PRESSURE: 104 MMHG | TEMPERATURE: 98 F | HEIGHT: 72 IN | RESPIRATION RATE: 17 BRPM | HEART RATE: 62 BPM | BODY MASS INDEX: 26.14 KG/M2 | OXYGEN SATURATION: 100 %

## 2023-04-11 DIAGNOSIS — N20.1 URETEROLITHIASIS: Primary | ICD-10-CM

## 2023-04-11 LAB
ALBUMIN SERPL-MCNC: 3.7 G/DL (ref 3.4–5)
ALBUMIN/GLOB SERPL: 1 {RATIO} (ref 1–2)
ALP LIVER SERPL-CCNC: 59 U/L
ALT SERPL-CCNC: 25 U/L
ANION GAP SERPL CALC-SCNC: 4 MMOL/L (ref 0–18)
AST SERPL-CCNC: 22 U/L (ref 15–37)
BASOPHILS # BLD AUTO: 0.06 X10(3) UL (ref 0–0.2)
BASOPHILS NFR BLD AUTO: 0.8 %
BILIRUB SERPL-MCNC: 0.5 MG/DL (ref 0.1–2)
BILIRUB UR QL STRIP.AUTO: NEGATIVE
BUN BLD-MCNC: 17 MG/DL (ref 7–18)
CALCIUM BLD-MCNC: 9.5 MG/DL (ref 8.5–10.1)
CHLORIDE SERPL-SCNC: 105 MMOL/L (ref 98–112)
CLARITY UR REFRACT.AUTO: CLEAR
CO2 SERPL-SCNC: 30 MMOL/L (ref 21–32)
COLOR UR AUTO: YELLOW
CREAT BLD-MCNC: 1.12 MG/DL
EOSINOPHIL # BLD AUTO: 0.28 X10(3) UL (ref 0–0.7)
EOSINOPHIL NFR BLD AUTO: 3.8 %
ERYTHROCYTE [DISTWIDTH] IN BLOOD BY AUTOMATED COUNT: 12.6 %
GFR SERPLBLD BASED ON 1.73 SQ M-ARVRAT: 84 ML/MIN/1.73M2 (ref 60–?)
GLOBULIN PLAS-MCNC: 3.6 G/DL (ref 2.8–4.4)
GLUCOSE BLD-MCNC: 113 MG/DL (ref 70–99)
GLUCOSE UR STRIP.AUTO-MCNC: NEGATIVE MG/DL
HCT VFR BLD AUTO: 40.7 %
HGB BLD-MCNC: 13.6 G/DL
IMM GRANULOCYTES # BLD AUTO: 0.02 X10(3) UL (ref 0–1)
IMM GRANULOCYTES NFR BLD: 0.3 %
KETONES UR STRIP.AUTO-MCNC: NEGATIVE MG/DL
LEUKOCYTE ESTERASE UR QL STRIP.AUTO: NEGATIVE
LIPASE SERPL-CCNC: 33 U/L (ref 13–75)
LYMPHOCYTES # BLD AUTO: 2.57 X10(3) UL (ref 1–4)
LYMPHOCYTES NFR BLD AUTO: 35 %
MCH RBC QN AUTO: 30.2 PG (ref 26–34)
MCHC RBC AUTO-ENTMCNC: 33.4 G/DL (ref 31–37)
MCV RBC AUTO: 90.4 FL
MONOCYTES # BLD AUTO: 0.68 X10(3) UL (ref 0.1–1)
MONOCYTES NFR BLD AUTO: 9.3 %
NEUTROPHILS # BLD AUTO: 3.73 X10 (3) UL (ref 1.5–7.7)
NEUTROPHILS # BLD AUTO: 3.73 X10(3) UL (ref 1.5–7.7)
NEUTROPHILS NFR BLD AUTO: 50.8 %
NITRITE UR QL STRIP.AUTO: NEGATIVE
OSMOLALITY SERPL CALC.SUM OF ELEC: 290 MOSM/KG (ref 275–295)
PH UR STRIP.AUTO: 6 [PH] (ref 5–8)
PLATELET # BLD AUTO: 196 10(3)UL (ref 150–450)
POTASSIUM SERPL-SCNC: 3.8 MMOL/L (ref 3.5–5.1)
PROT SERPL-MCNC: 7.3 G/DL (ref 6.4–8.2)
PROT UR STRIP.AUTO-MCNC: NEGATIVE MG/DL
RBC # BLD AUTO: 4.5 X10(6)UL
SODIUM SERPL-SCNC: 139 MMOL/L (ref 136–145)
SP GR UR STRIP.AUTO: 1.01 (ref 1–1.03)
UROBILINOGEN UR STRIP.AUTO-MCNC: 0.2 MG/DL
WBC # BLD AUTO: 7.3 X10(3) UL (ref 4–11)

## 2023-04-11 PROCEDURE — 80053 COMPREHEN METABOLIC PANEL: CPT | Performed by: EMERGENCY MEDICINE

## 2023-04-11 PROCEDURE — 81001 URINALYSIS AUTO W/SCOPE: CPT | Performed by: EMERGENCY MEDICINE

## 2023-04-11 PROCEDURE — 99285 EMERGENCY DEPT VISIT HI MDM: CPT

## 2023-04-11 PROCEDURE — 85025 COMPLETE CBC W/AUTO DIFF WBC: CPT | Performed by: EMERGENCY MEDICINE

## 2023-04-11 PROCEDURE — 96361 HYDRATE IV INFUSION ADD-ON: CPT

## 2023-04-11 PROCEDURE — 81001 URINALYSIS AUTO W/SCOPE: CPT

## 2023-04-11 PROCEDURE — 74176 CT ABD & PELVIS W/O CONTRAST: CPT | Performed by: EMERGENCY MEDICINE

## 2023-04-11 PROCEDURE — 81015 MICROSCOPIC EXAM OF URINE: CPT

## 2023-04-11 PROCEDURE — 99284 EMERGENCY DEPT VISIT MOD MDM: CPT

## 2023-04-11 PROCEDURE — 96374 THER/PROPH/DIAG INJ IV PUSH: CPT

## 2023-04-11 PROCEDURE — 83690 ASSAY OF LIPASE: CPT | Performed by: EMERGENCY MEDICINE

## 2023-04-11 RX ORDER — DICYCLOMINE HCL 20 MG
20 TABLET ORAL 4 TIMES DAILY PRN
Qty: 15 TABLET | Refills: 0 | Status: SHIPPED | OUTPATIENT
Start: 2023-04-11

## 2023-04-11 RX ORDER — SODIUM CHLORIDE 9 MG/ML
INJECTION, SOLUTION INTRAVENOUS ONCE
Status: COMPLETED | OUTPATIENT
Start: 2023-04-11 | End: 2023-04-11

## 2023-04-11 RX ORDER — KETOROLAC TROMETHAMINE 30 MG/ML
30 INJECTION, SOLUTION INTRAMUSCULAR; INTRAVENOUS ONCE
Status: COMPLETED | OUTPATIENT
Start: 2023-04-11 | End: 2023-04-11

## 2023-04-11 RX ORDER — ONDANSETRON 4 MG/1
4 TABLET, ORALLY DISINTEGRATING ORAL EVERY 4 HOURS PRN
Qty: 10 TABLET | Refills: 0 | Status: SHIPPED | OUTPATIENT
Start: 2023-04-11 | End: 2023-04-18

## 2023-04-12 ENCOUNTER — OFFICE VISIT (OUTPATIENT)
Dept: SURGERY | Facility: CLINIC | Age: 43
End: 2023-04-12

## 2023-04-12 DIAGNOSIS — N20.0 KIDNEY STONES: Primary | ICD-10-CM

## 2023-04-12 LAB
APPEARANCE: CLEAR
BILIRUBIN: NEGATIVE
GLUCOSE (URINE DIPSTICK): NEGATIVE MG/DL
KETONES (URINE DIPSTICK): NEGATIVE MG/DL
LEUKOCYTES: NEGATIVE
MULTISTIX LOT#: ABNORMAL NUMERIC
NITRITE, URINE: NEGATIVE
PH, URINE: 6 (ref 4.5–8)
PROTEIN (URINE DIPSTICK): NEGATIVE MG/DL
SPECIFIC GRAVITY: 1.01 (ref 1–1.03)
URINE-COLOR: YELLOW
UROBILINOGEN,SEMI-QN: 0.2 MG/DL (ref 0–1.9)

## 2023-04-12 PROCEDURE — 99243 OFF/OP CNSLTJ NEW/EST LOW 30: CPT | Performed by: PHYSICIAN ASSISTANT

## 2023-04-12 PROCEDURE — 81003 URINALYSIS AUTO W/O SCOPE: CPT | Performed by: PHYSICIAN ASSISTANT

## 2023-04-12 RX ORDER — SOD SULF/POT CHLORIDE/MAG SULF 1.479 G
TABLET ORAL
COMMUNITY
Start: 2022-10-25

## 2023-04-12 RX ORDER — CLOBETASOL PROPIONATE 0.5 MG/G
1 CREAM TOPICAL 2 TIMES DAILY
COMMUNITY
Start: 2023-02-22

## 2023-04-12 RX ORDER — CEPHALEXIN 500 MG/1
500 CAPSULE ORAL 3 TIMES DAILY
COMMUNITY
Start: 2023-02-24

## 2023-04-12 NOTE — ED INITIAL ASSESSMENT (HPI)
Right flank pain for the past couple of days, pain is worse today, hx of kidney stones.  Denies any N/V.

## 2023-07-26 ENCOUNTER — TELEPHONE (OUTPATIENT)
Dept: INTERNAL MEDICINE CLINIC | Facility: CLINIC | Age: 43
End: 2023-07-26

## 2023-07-26 DIAGNOSIS — K51.00 ULCERATIVE CHRONIC PANCOLITIS WITHOUT COMPLICATIONS (HCC): Primary | ICD-10-CM

## 2023-07-26 DIAGNOSIS — M85.80 OSTEOPENIA, UNSPECIFIED LOCATION: ICD-10-CM

## 2023-09-15 ENCOUNTER — HOSPITAL ENCOUNTER (OUTPATIENT)
Dept: BONE DENSITY | Age: 43
Discharge: HOME | End: 2023-09-15
Attending: INTERNAL MEDICINE
Payer: COMMERCIAL

## 2023-09-15 DIAGNOSIS — M85.80 OSTEOPENIA, UNSPECIFIED LOCATION: ICD-10-CM

## 2023-09-15 DIAGNOSIS — K51.00 ULCERATIVE CHRONIC PANCOLITIS WITHOUT COMPLICATIONS (HCC): ICD-10-CM

## 2023-09-15 PROCEDURE — 77080 DXA BONE DENSITY AXIAL: CPT | Performed by: INTERNAL MEDICINE

## 2023-09-22 NOTE — PROGRESS NOTES
Left detailed message on VM, also advised to read [employer] message and call back if any questions

## 2023-11-20 ENCOUNTER — LAB ENCOUNTER (OUTPATIENT)
Dept: LAB | Facility: HOSPITAL | Age: 43
End: 2023-11-20
Attending: INTERNAL MEDICINE
Payer: COMMERCIAL

## 2023-11-20 DIAGNOSIS — K51.00 ULCERATIVE PANCOLITIS WITHOUT COMPLICATION (HCC): ICD-10-CM

## 2023-11-20 DIAGNOSIS — Z79.899 OTHER LONG TERM (CURRENT) DRUG THERAPY: ICD-10-CM

## 2023-11-20 LAB
ALBUMIN SERPL-MCNC: 3.9 G/DL (ref 3.4–5)
ALBUMIN/GLOB SERPL: 1 {RATIO} (ref 1–2)
ALP LIVER SERPL-CCNC: 50 U/L
ALT SERPL-CCNC: 19 U/L
ANION GAP SERPL CALC-SCNC: 4 MMOL/L (ref 0–18)
AST SERPL-CCNC: 24 U/L (ref 15–37)
BASOPHILS # BLD AUTO: 0.04 X10(3) UL (ref 0–0.2)
BASOPHILS NFR BLD AUTO: 0.8 %
BILIRUB SERPL-MCNC: 0.6 MG/DL (ref 0.1–2)
BUN BLD-MCNC: 15 MG/DL (ref 9–23)
CALCIUM BLD-MCNC: 9.2 MG/DL (ref 8.5–10.1)
CHLORIDE SERPL-SCNC: 106 MMOL/L (ref 98–112)
CHOLEST SERPL-MCNC: 186 MG/DL (ref ?–200)
CO2 SERPL-SCNC: 30 MMOL/L (ref 21–32)
CREAT BLD-MCNC: 0.98 MG/DL
EGFRCR SERPLBLD CKD-EPI 2021: 98 ML/MIN/1.73M2 (ref 60–?)
EOSINOPHIL # BLD AUTO: 0.15 X10(3) UL (ref 0–0.7)
EOSINOPHIL NFR BLD AUTO: 3 %
ERYTHROCYTE [DISTWIDTH] IN BLOOD BY AUTOMATED COUNT: 12.1 %
FASTING PATIENT LIPID ANSWER: YES
FASTING STATUS PATIENT QL REPORTED: YES
GLOBULIN PLAS-MCNC: 3.8 G/DL (ref 2.8–4.4)
GLUCOSE BLD-MCNC: 99 MG/DL (ref 70–99)
HCT VFR BLD AUTO: 42.1 %
HDLC SERPL-MCNC: 59 MG/DL (ref 40–59)
HGB BLD-MCNC: 14.2 G/DL
IMM GRANULOCYTES # BLD AUTO: 0.02 X10(3) UL (ref 0–1)
IMM GRANULOCYTES NFR BLD: 0.4 %
LDLC SERPL CALC-MCNC: 114 MG/DL (ref ?–100)
LYMPHOCYTES # BLD AUTO: 1.92 X10(3) UL (ref 1–4)
LYMPHOCYTES NFR BLD AUTO: 38.9 %
MCH RBC QN AUTO: 29.8 PG (ref 26–34)
MCHC RBC AUTO-ENTMCNC: 33.7 G/DL (ref 31–37)
MCV RBC AUTO: 88.4 FL
MONOCYTES # BLD AUTO: 0.48 X10(3) UL (ref 0.1–1)
MONOCYTES NFR BLD AUTO: 9.7 %
NEUTROPHILS # BLD AUTO: 2.33 X10 (3) UL (ref 1.5–7.7)
NEUTROPHILS # BLD AUTO: 2.33 X10(3) UL (ref 1.5–7.7)
NEUTROPHILS NFR BLD AUTO: 47.2 %
NONHDLC SERPL-MCNC: 127 MG/DL (ref ?–130)
OSMOLALITY SERPL CALC.SUM OF ELEC: 291 MOSM/KG (ref 275–295)
PLATELET # BLD AUTO: 166 10(3)UL (ref 150–450)
POTASSIUM SERPL-SCNC: 4 MMOL/L (ref 3.5–5.1)
PROT SERPL-MCNC: 7.7 G/DL (ref 6.4–8.2)
RBC # BLD AUTO: 4.76 X10(6)UL
SODIUM SERPL-SCNC: 140 MMOL/L (ref 136–145)
TRIGL SERPL-MCNC: 71 MG/DL (ref 30–149)
VLDLC SERPL CALC-MCNC: 12 MG/DL (ref 0–30)
WBC # BLD AUTO: 4.9 X10(3) UL (ref 4–11)

## 2023-11-20 PROCEDURE — 80053 COMPREHEN METABOLIC PANEL: CPT

## 2023-11-20 PROCEDURE — 85025 COMPLETE CBC W/AUTO DIFF WBC: CPT

## 2023-11-20 PROCEDURE — 36415 COLL VENOUS BLD VENIPUNCTURE: CPT

## 2023-11-20 PROCEDURE — 80061 LIPID PANEL: CPT

## 2024-02-16 ENCOUNTER — HOSPITAL ENCOUNTER (INPATIENT)
Facility: HOSPITAL | Age: 44
LOS: 2 days | Discharge: HOME | End: 2024-02-18
Attending: EMERGENCY MEDICINE | Admitting: HOSPITALIST
Payer: COMMERCIAL

## 2024-02-16 ENCOUNTER — APPOINTMENT (OUTPATIENT)
Dept: CT IMAGING | Facility: HOSPITAL | Age: 44
End: 2024-02-16
Attending: EMERGENCY MEDICINE
Payer: COMMERCIAL

## 2024-02-16 ENCOUNTER — APPOINTMENT (OUTPATIENT)
Dept: GENERAL RADIOLOGY | Facility: HOSPITAL | Age: 44
End: 2024-02-16
Attending: EMERGENCY MEDICINE
Payer: COMMERCIAL

## 2024-02-16 ENCOUNTER — HOSPITAL ENCOUNTER (OUTPATIENT)
Facility: HOSPITAL | Age: 44
Setting detail: OBSERVATION
Discharge: HOME | End: 2024-02-18
Attending: EMERGENCY MEDICINE | Admitting: HOSPITALIST
Payer: COMMERCIAL

## 2024-02-16 DIAGNOSIS — M54.50 ACUTE RIGHT-SIDED LOW BACK PAIN, UNSPECIFIED WHETHER SCIATICA PRESENT: Primary | ICD-10-CM

## 2024-02-16 LAB
ALBUMIN SERPL-MCNC: 3.2 G/DL (ref 3.4–5)
ALBUMIN/GLOB SERPL: 1 {RATIO} (ref 1–2)
ALP LIVER SERPL-CCNC: 43 U/L
ALT SERPL-CCNC: 17 U/L
ANION GAP SERPL CALC-SCNC: 2 MMOL/L (ref 0–18)
AST SERPL-CCNC: 20 U/L (ref 15–37)
ATRIAL RATE: 68 BPM
BASOPHILS # BLD AUTO: 0.02 X10(3) UL (ref 0–0.2)
BASOPHILS NFR BLD AUTO: 0.5 %
BILIRUB SERPL-MCNC: 0.8 MG/DL (ref 0.1–2)
BILIRUB UR QL STRIP.AUTO: NEGATIVE
BUN BLD-MCNC: 13 MG/DL (ref 9–23)
CALCIUM BLD-MCNC: 7.8 MG/DL (ref 8.5–10.1)
CHLORIDE SERPL-SCNC: 114 MMOL/L (ref 98–112)
CLARITY UR REFRACT.AUTO: CLEAR
CO2 SERPL-SCNC: 26 MMOL/L (ref 21–32)
CREAT BLD-MCNC: 0.7 MG/DL
D DIMER PPP FEU-MCNC: <0.27 UG/ML FEU (ref ?–0.5)
EGFRCR SERPLBLD CKD-EPI 2021: 117 ML/MIN/1.73M2 (ref 60–?)
EOSINOPHIL # BLD AUTO: 0.09 X10(3) UL (ref 0–0.7)
EOSINOPHIL NFR BLD AUTO: 2.2 %
ERYTHROCYTE [DISTWIDTH] IN BLOOD BY AUTOMATED COUNT: 11.9 %
GLOBULIN PLAS-MCNC: 3.1 G/DL (ref 2.8–4.4)
GLUCOSE BLD-MCNC: 84 MG/DL (ref 70–99)
GLUCOSE UR STRIP.AUTO-MCNC: NORMAL MG/DL
HCT VFR BLD AUTO: 40.4 %
HGB BLD-MCNC: 13.9 G/DL
IMM GRANULOCYTES # BLD AUTO: 0.01 X10(3) UL (ref 0–1)
IMM GRANULOCYTES NFR BLD: 0.2 %
KETONES UR STRIP.AUTO-MCNC: NEGATIVE MG/DL
LEUKOCYTE ESTERASE UR QL STRIP.AUTO: NEGATIVE
LYMPHOCYTES # BLD AUTO: 1.44 X10(3) UL (ref 1–4)
LYMPHOCYTES NFR BLD AUTO: 35.7 %
MCH RBC QN AUTO: 29.8 PG (ref 26–34)
MCHC RBC AUTO-ENTMCNC: 34.4 G/DL (ref 31–37)
MCV RBC AUTO: 86.7 FL
MONOCYTES # BLD AUTO: 0.46 X10(3) UL (ref 0.1–1)
MONOCYTES NFR BLD AUTO: 11.4 %
NEUTROPHILS # BLD AUTO: 2.01 X10 (3) UL (ref 1.5–7.7)
NEUTROPHILS # BLD AUTO: 2.01 X10(3) UL (ref 1.5–7.7)
NEUTROPHILS NFR BLD AUTO: 50 %
NITRITE UR QL STRIP.AUTO: NEGATIVE
OSMOLALITY SERPL CALC.SUM OF ELEC: 293 MOSM/KG (ref 275–295)
P AXIS: 2 DEGREES
P-R INTERVAL: 162 MS
PH UR STRIP.AUTO: 7 [PH] (ref 5–8)
PLATELET # BLD AUTO: 164 10(3)UL (ref 150–450)
POTASSIUM SERPL-SCNC: 3 MMOL/L (ref 3.5–5.1)
PROT SERPL-MCNC: 6.3 G/DL (ref 6.4–8.2)
PROT UR STRIP.AUTO-MCNC: NEGATIVE MG/DL
Q-T INTERVAL: 412 MS
QRS DURATION: 88 MS
QTC CALCULATION (BEZET): 438 MS
R AXIS: 21 DEGREES
RBC # BLD AUTO: 4.66 X10(6)UL
RBC UR QL AUTO: NEGATIVE
SODIUM SERPL-SCNC: 142 MMOL/L (ref 136–145)
SP GR UR STRIP.AUTO: 1.01 (ref 1–1.03)
T AXIS: 47 DEGREES
TROPONIN I SERPL HS-MCNC: 3 NG/L
UROBILINOGEN UR STRIP.AUTO-MCNC: NORMAL MG/DL
VENTRICULAR RATE: 68 BPM
WBC # BLD AUTO: 4 X10(3) UL (ref 4–11)

## 2024-02-16 PROCEDURE — 72110 X-RAY EXAM L-2 SPINE 4/>VWS: CPT | Performed by: EMERGENCY MEDICINE

## 2024-02-16 PROCEDURE — 74177 CT ABD & PELVIS W/CONTRAST: CPT | Performed by: EMERGENCY MEDICINE

## 2024-02-16 PROCEDURE — 72072 X-RAY EXAM THORAC SPINE 3VWS: CPT | Performed by: EMERGENCY MEDICINE

## 2024-02-16 PROCEDURE — 71045 X-RAY EXAM CHEST 1 VIEW: CPT | Performed by: EMERGENCY MEDICINE

## 2024-02-16 RX ORDER — POTASSIUM CHLORIDE 20 MEQ/1
40 TABLET, EXTENDED RELEASE ORAL EVERY 4 HOURS
Status: DISPENSED | OUTPATIENT
Start: 2024-02-16 | End: 2024-02-17

## 2024-02-16 RX ORDER — MORPHINE SULFATE 4 MG/ML
4 INJECTION, SOLUTION INTRAMUSCULAR; INTRAVENOUS ONCE
Status: COMPLETED | OUTPATIENT
Start: 2024-02-16 | End: 2024-02-16

## 2024-02-16 RX ORDER — DIPHENHYDRAMINE HYDROCHLORIDE 50 MG/ML
25 INJECTION INTRAMUSCULAR; INTRAVENOUS ONCE
Status: COMPLETED | OUTPATIENT
Start: 2024-02-16 | End: 2024-02-16

## 2024-02-16 RX ORDER — METHYLPREDNISOLONE SODIUM SUCCINATE 125 MG/2ML
125 INJECTION, POWDER, LYOPHILIZED, FOR SOLUTION INTRAMUSCULAR; INTRAVENOUS ONCE
Status: COMPLETED | OUTPATIENT
Start: 2024-02-16 | End: 2024-02-16

## 2024-02-16 RX ORDER — HYDROMORPHONE HYDROCHLORIDE 1 MG/ML
0.2 INJECTION, SOLUTION INTRAMUSCULAR; INTRAVENOUS; SUBCUTANEOUS EVERY 2 HOUR PRN
Status: DISCONTINUED | OUTPATIENT
Start: 2024-02-16 | End: 2024-02-18

## 2024-02-16 RX ORDER — ENEMA 19; 7 G/133ML; G/133ML
1 ENEMA RECTAL ONCE AS NEEDED
Status: DISCONTINUED | OUTPATIENT
Start: 2024-02-16 | End: 2024-02-18

## 2024-02-16 RX ORDER — POTASSIUM CHLORIDE 20 MEQ/1
40 TABLET, EXTENDED RELEASE ORAL ONCE
Status: COMPLETED | OUTPATIENT
Start: 2024-02-16 | End: 2024-02-16

## 2024-02-16 RX ORDER — MORPHINE SULFATE 4 MG/ML
4 INJECTION, SOLUTION INTRAMUSCULAR; INTRAVENOUS ONCE
Status: DISCONTINUED | OUTPATIENT
Start: 2024-02-16 | End: 2024-02-16

## 2024-02-16 RX ORDER — HYDROMORPHONE HYDROCHLORIDE 1 MG/ML
0.5 INJECTION, SOLUTION INTRAMUSCULAR; INTRAVENOUS; SUBCUTANEOUS EVERY 30 MIN PRN
Status: DISCONTINUED | OUTPATIENT
Start: 2024-02-16 | End: 2024-02-16

## 2024-02-16 RX ORDER — ONDANSETRON 2 MG/ML
4 INJECTION INTRAMUSCULAR; INTRAVENOUS EVERY 4 HOURS PRN
Status: DISCONTINUED | OUTPATIENT
Start: 2024-02-16 | End: 2024-02-16

## 2024-02-16 RX ORDER — MELATONIN
3 NIGHTLY PRN
Status: DISCONTINUED | OUTPATIENT
Start: 2024-02-16 | End: 2024-02-18

## 2024-02-16 RX ORDER — ONDANSETRON 2 MG/ML
4 INJECTION INTRAMUSCULAR; INTRAVENOUS EVERY 6 HOURS PRN
Status: DISCONTINUED | OUTPATIENT
Start: 2024-02-16 | End: 2024-02-18

## 2024-02-16 RX ORDER — POLYETHYLENE GLYCOL 3350 17 G/17G
17 POWDER, FOR SOLUTION ORAL DAILY PRN
Status: DISCONTINUED | OUTPATIENT
Start: 2024-02-16 | End: 2024-02-18

## 2024-02-16 RX ORDER — ACETAMINOPHEN AND CODEINE PHOSPHATE 300; 30 MG/1; MG/1
2 TABLET ORAL EVERY 4 HOURS PRN
Status: DISCONTINUED | OUTPATIENT
Start: 2024-02-16 | End: 2024-02-17

## 2024-02-16 RX ORDER — ACETAMINOPHEN AND CODEINE PHOSPHATE 300; 30 MG/1; MG/1
1 TABLET ORAL EVERY 4 HOURS PRN
Status: DISCONTINUED | OUTPATIENT
Start: 2024-02-16 | End: 2024-02-17

## 2024-02-16 RX ORDER — SENNOSIDES 8.6 MG
17.2 TABLET ORAL DAILY
Status: DISCONTINUED | OUTPATIENT
Start: 2024-02-16 | End: 2024-02-18

## 2024-02-16 RX ORDER — BISACODYL 10 MG
10 SUPPOSITORY, RECTAL RECTAL
Status: DISCONTINUED | OUTPATIENT
Start: 2024-02-16 | End: 2024-02-18

## 2024-02-16 RX ORDER — HYDROMORPHONE HYDROCHLORIDE 1 MG/ML
0.8 INJECTION, SOLUTION INTRAMUSCULAR; INTRAVENOUS; SUBCUTANEOUS EVERY 2 HOUR PRN
Status: DISCONTINUED | OUTPATIENT
Start: 2024-02-16 | End: 2024-02-18

## 2024-02-16 RX ORDER — DIAZEPAM 5 MG/ML
5 INJECTION, SOLUTION INTRAMUSCULAR; INTRAVENOUS ONCE
Status: COMPLETED | OUTPATIENT
Start: 2024-02-16 | End: 2024-02-16

## 2024-02-16 RX ORDER — HYDROMORPHONE HYDROCHLORIDE 1 MG/ML
0.5 INJECTION, SOLUTION INTRAMUSCULAR; INTRAVENOUS; SUBCUTANEOUS ONCE
Status: COMPLETED | OUTPATIENT
Start: 2024-02-16 | End: 2024-02-16

## 2024-02-16 RX ORDER — METHOCARBAMOL 500 MG/1
500 TABLET, FILM COATED ORAL 3 TIMES DAILY PRN
Status: DISCONTINUED | OUTPATIENT
Start: 2024-02-16 | End: 2024-02-18

## 2024-02-16 RX ORDER — HYDROMORPHONE HYDROCHLORIDE 1 MG/ML
0.4 INJECTION, SOLUTION INTRAMUSCULAR; INTRAVENOUS; SUBCUTANEOUS EVERY 2 HOUR PRN
Status: DISCONTINUED | OUTPATIENT
Start: 2024-02-16 | End: 2024-02-18

## 2024-02-16 RX ORDER — ACETAMINOPHEN 325 MG/1
650 TABLET ORAL EVERY 4 HOURS PRN
Status: DISCONTINUED | OUTPATIENT
Start: 2024-02-16 | End: 2024-02-18

## 2024-02-16 NOTE — ED INITIAL ASSESSMENT (HPI)
Patient to the ED with c/o right-sided back spasms after patient went to go lift something this morning. Patient tried to manage the pain at home, sat through a meeting and the pain was getting progressively worse. No issues with controlling bladder and/or bowel. Hx of back pain, but no back surgeries.

## 2024-02-16 NOTE — H&P
[facility]     Rolando Byers Patient Status:  Emergency    1980 MRN [id number]   Location [facility] EMERGENCY DEPARTMENT Attending Jordi Pedroza*   Hosp Day # 0 PCP Deon Bella MD     Chief Complaint: back pain     Subjective:    History of Present Illness:   Rolando Byers is a 43 year old male with intractable back pain.  Patient states he was moving a laundry basket earlier today and twisted the wrong way.  States it was in his right lower thoracic and lumbar area.  Continue to worsen throughout the day.  He sat down for work meeting which she continued to spasm.  The pain became 10 out of 10 he fell to the floor and could not get up.  He called his wife and subsequently needed paramedics to get him to the emergency department.  He has minimal mobility at this time even with passive range of motion due to the severity of the pain.  No fecal or urinary incontinence.  He has a history of ulcerative colitis no changes in medications.  He did try 1 Flexeril at home without any relief.  He denies chest pain or shortness of breath.    History/Other:    Past Medical History:  Past Medical History:   Diagnosis Date    Bloating 20    Calculus of kidney 2018    Fatigue 20    Flatulence/gas pain/belching 20    Sleep disturbance 20    I wake up during sleep due to bloating    UC (ulcerative colitis) ([facility])      Past Surgical History:   Past Surgical History:   Procedure Laterality Date    APPENDECTOMY        Family History:   Family History   Problem Relation Age of Onset    Heart Disease Father     Hypertension Mother      Social History:    reports that he has never smoked. He has never used smokeless tobacco. He reports that he does not currently use alcohol after a past usage of about 4.0 standard drinks of alcohol per week. He reports that he does not use drugs.   Allergies:   Allergies   Allergen Reactions    Iodine (Topical) HIVES    Radiology  Contrast Iodinated Dyes RASH     Medications:    No current facility-administered medications on file prior to encounter.     Current Outpatient Medications on File Prior to Encounter   Medication Sig Dispense Refill    XELJANZ 5 MG Oral Tab Take 1 tablet by mouth 2 (two) times daily. 60 tablet 11    Tofacitinib Citrate (XELJANZ) 5 MG Oral Tab Take 5 mg by mouth in the morning and 5 mg before bedtime. 60 tablet 0     Review of Systems:   A comprehensive review of systems was completed.    Pertinent positives and negatives noted in the HPI.    Objective:   Physical Exam:    /84   Pulse 61   Temp 98 °F (36.7 °C)   Resp 12   Ht 6' (1.829 m)   Wt 195 lb (88.5 kg)   SpO2 93%   BMI 26.45 kg/m²   General: No acute distress, Alert.  In pain  Respiratory: No rhonchi, no wheezes  Cardiovascular: S1, S2. Regular rate and rhythm  Abdomen: Soft, NT/ND, +BS  Back: Intractable tenderness in lower thoracic and lumbar region-right paravertebral most sensitive.  Neuro: No new focal deficits  Extremities: No edema    Results:    Labs:    Labs Last 24 Hours:  Recent Labs   Lab 02/16/24  1230   RBC 4.66   HGB 13.9   HCT 40.4   MCV 86.7   MCH 29.8   MCHC 34.4   RDW 11.9   NEPRELIM 2.01   WBC 4.0   .0     Recent Labs   Lab 02/16/24  1230   GLU 84   BUN 13   CREATSERUM 0.70   EGFRCR 117   CA 7.8*   ALB 3.2*      K 3.0*   *   CO2 26.0   ALKPHO 43*   AST 20   ALT 17   BILT 0.8   TP 6.3*     No results found for: \"PT\", \"INR\"  Recent Labs   Lab 02/16/24  1230   TROPHS 3     No results for input(s): \"TROP\", \"PBNP\" in the last 168 hours.  No results for input(s): \"PCT\" in the last 168 hours.  Imaging: Imaging data reviewed in Epic.    Assessment & Plan:      # Intractable back pain-very severe on exam with unable to perform any passive range of motion.  No weightbearing.  -No acute neurological changes or incontinence.  -IV analgesics, muscle relaxers  -CT abdomen ordered in the ER to rule out retroperitoneal  bleed  -Check MRI T/L Spine and spine consult Dr. Bolden   -PT/OT    #Ulcerative colitis#-controlled on PTA medication  # Hypokalemia-replace      Quality:  DVT Mechanical Prophylaxis:        DVT Pharmacologic Prophylaxis   Medication   None              Code Status: Not on file  Noel: No urinary catheter in place  Noel Duration (in days):   Central line:    ZION:   Plan of care discussed with patient, staff, wife     Colby Rivera MD  Supplementary Documentation:     The 21st Century Cures Act makes medical notes like these available to patients in the interest of transparency. Please be advised this is a medical document. Medical documents are intended to carry relevant information, facts as evident, and the clinical opinion of the practitioner. The medical note is intended as peer to peer communication and may appear blunt or direct. It is written in medical language and may contain abbreviations or verbiage that are unfamiliar.

## 2024-02-16 NOTE — ED QUICK NOTES
Rounded on patient. Sleeping on stretcher. RR even and non labored. Equal chest rise and fall. No distressed noted.

## 2024-02-16 NOTE — ED PROVIDER NOTES
Patient Seen in: [facility] Emergency Department      History     Chief Complaint   Patient presents with    Spasms     Stated Complaint: Back spasms after lifting heavy item    Subjective:   HPI    39-year-old male with past medical history of ulcerative colitis presents today for evaluation of a back pain.  Patient was moving a laundry basket and noticed a discomfort in his right thoracic back.  He sat down for a work meeting.  After the meeting, his back locked up and he felt a spasm.  He took a Flexeril at home without any improvement.  The pain intensified he was unable to get up because of the spasm in his back.  He denies any recent trauma.  He has no loss of bowel or bladder function.  He has no saddle anesthesia.  There is no reported chest pain or shortness of breath.    Objective:   Past Medical History:   Diagnosis Date    Bloating 11/1/20    Calculus of kidney 2018    Fatigue 11/1/20    Flatulence/gas pain/belching 11/1/20    Sleep disturbance 11/1/20    I wake up during sleep due to bloating    UC (ulcerative colitis) (HCC)               Past Surgical History:   Procedure Laterality Date    APPENDECTOMY                  Social History     Socioeconomic History    Marital status:    Tobacco Use    Smoking status: Never    Smokeless tobacco: Never   Vaping Use    Vaping Use: Never used   Substance and Sexual Activity    Alcohol use: Not Currently     Alcohol/week: 4.0 standard drinks of alcohol     Types: 2 Glasses of wine, 2 Standard drinks or equivalent per week     Comment: weekends; light intake    Drug use: No   Other Topics Concern    Caffeine Concern Yes     Comment: 1 cup daily    Exercise Yes     Comment: 2-3 times weekly              Review of Systems    Positive for stated complaint: Back spasms after lifting heavy item  Other systems are as noted in HPI.  Constitutional and vital signs reviewed.      All other systems reviewed and negative except as noted above.    Physical Exam      ED Triage Vitals [02/16/24 1231]   /78   Pulse 62   Resp 14   Temp 98 °F (36.7 °C)   Temp src    SpO2 100 %   O2 Device None (Room air)       Current:/84   Pulse 76   Temp 98 °F (36.7 °C)   Resp 16   Ht 182.9 cm (6')   Wt 88.5 kg   SpO2 100%   BMI 26.45 kg/m²         Physical Exam  Vitals and nursing note reviewed.   Constitutional:       Appearance: Normal appearance.   HENT:      Head: Normocephalic.      Nose: Nose normal.      Mouth/Throat:      Mouth: Mucous membranes are moist.   Eyes:      Extraocular Movements: Extraocular movements intact.   Cardiovascular:      Rate and Rhythm: Normal rate.   Pulmonary:      Effort: Pulmonary effort is normal.   Abdominal:      General: Abdomen is flat.   Musculoskeletal:         General: Normal range of motion.      Comments: No midline thoracolumbar erythema, step-off or deformity.  Right thoracic paraspinal tenderness and spasm.   Skin:     General: Skin is warm.   Neurological:      General: No focal deficit present.      Mental Status: He is alert.      Cranial Nerves: No cranial nerve deficit.      Sensory: No sensory deficit.      Motor: No weakness.      Coordination: Coordination normal.      Deep Tendon Reflexes: Reflexes normal.   Psychiatric:         Mood and Affect: Mood normal.           ED Course     Labs Reviewed   COMP METABOLIC PANEL (14) - Abnormal; Notable for the following components:       Result Value    Potassium 3.0 (*)     Chloride 114 (*)     Calcium, Total 7.8 (*)     Alkaline Phosphatase 43 (*)     Total Protein 6.3 (*)     Albumin 3.2 (*)     All other components within normal limits   TROPONIN I HIGH SENSITIVITY - Normal   D-DIMER - Normal   CBC WITH DIFFERENTIAL WITH PLATELET    Narrative:     The following orders were created for panel order CBC With Differential With Platelet.  Procedure                               Abnormality         Status                     ---------                               -----------          ------                     CBC W/ DIFFERENTIAL[[id number]]                              Final result                 Please view results for these tests on the individual orders.   URINALYSIS WITH CULTURE REFLEX   RAINBOW DRAW LAVENDER   RAINBOW DRAW LIGHT GREEN   RAINBOW DRAW BLUE   CBC W/ DIFFERENTIAL     EKG    Rate, intervals and axes as noted on EKG Report.  Rate: 68  Rhythm: Sinus Rhythm  Reading: no STEMI                 CT ABDOMEN+PELVIS(CONTRAST ONLY)(CPT=74177)    Result Date: 2/16/2024  PROCEDURE:  CT ABDOMEN+PELVIS (CONTRAST ONLY) (CPT=74177)  COMPARISON:  None.  INDICATIONS:  , retroperitoneal hematoma? ureterolithiasis?  TECHNIQUE:  CT scanning was performed from the dome of the diaphragm to the pubic symphysis with non-ionic intravenous contrast material. Post contrast coronal MPR imaging was performed.  Dose reduction techniques were used. Dose information is transmitted to the ACR (American College of Radiology) NRDR (National Radiology Data Registry) which includes the Dose Index Registry.  Patient was pre-medicated for contrast allergy without incident.  PATIENT STATED HISTORY:(As transcribed by Technologist)  Patient with lower back pain after lifting a clothes basket at home.   CONTRAST USED:  100cc of Isovue 370  FINDINGS:  LUNG BASE:  Atelectasis. LIVER:  Homogeneous enhancement. BILIARY:  No biliary ductal dilatation. PANCREAS:  Homogeneous enhancement. SPLEEN:  Normal caliber. KIDNEYS:  No hydronephrosis or suspicious renal mass.  There is a 1 cm cyst in the midpole of the left kidney.  No nephrolithiasis.  No obstructing urinary calculus. ADRENALS:  Normal. AORTA/VASCULAR:  No aneurysm. RETROPERITONEUM:  No enlarged adenopathy.  No retroperitoneal hematoma as question. BOWEL/MESENTERY:  Normal caliber bowel loops. Uncomplicated colonic diverticulosis ABDOMINAL WALL:  Small fat containing umbilical hernia.  PELVIC ORGANS:  Prostatic calcification.  BONES:  Mild degenerative changes in  the lower lumbar facets.             CONCLUSION:  No CT evidence for acute inflammatory process in the abdomen or pelvis.  No nephrolithiasis or hydronephrosis.  No retroperitoneal hematoma.  LOCATION:  [id number]   Dictated by (CST): Amie Mahmood MD on 2/16/2024 at 5:17 PM     Finalized by (CST): Amie Mahmood MD on 2/16/2024 at 5:20 PM       XR THORACIC SPINE (3 VIEWS) (CPT=72072)    Result Date: 2/16/2024  PROCEDURE:  XR THORACIC SPINE (3 VIEWS) (CPT=72072)  TECHNIQUE:  AP, lateral, and swimmer's views of the thoracic spine were obtained.  COMPARISON:  None.  INDICATIONS:  Back spasms after lifting heavy item  PATIENT STATED HISTORY: (As transcribed by Technologist)  Patient twisted his back picking up a heavy laundry basket earlier today, has been having back spasms since.     FINDINGS:  Mild endplate degenerative changes in the thoracic spine.  No acute displaced osseous fracture is identified.             CONCLUSION:  See above.   LOCATION:  [id number]    Dictated by (CST): Stromberg, LeRoy, MD on 2/16/2024 at 2:27 PM     Finalized by (CST): Stromberg, LeRoy, MD on 2/16/2024 at 2:29 PM       XR LUMBAR SPINE (MIN 4 VIEWS) (CPT=72110)    Result Date: 2/16/2024  PROCEDURE:  XR LUMBAR SPINE (MIN 4 VIEWS) (CPT=72110)  TECHNIQUE:  AP, lateral, oblique, and coned down L5-S1 views were obtained.  COMPARISON:  [facility] , XR, XR LUMBAR SPINE (MIN 4 VIEWS) (CPT=72110), 12/27/2016, 3:45 PM.  INDICATIONS:  Back spasms after lifting heavy item  PATIENT STATED HISTORY: (As transcribed by Technologist)  Patient twisted his back picking up a heavy laundry basket earlier today, has been having back spasms since.     FINDINGS:  T12 may be non-rib-bearing.  Consider elective whole spine counting radiographs as clinically appropriate. There is straightening of the lumbar lordosis.  The lower lumbar spine may be developmentally slender.  There is mild degenerative disc disease at L5-S1.  There is mild facet arthropathy at L5-S1.  There  are minimal endplate degenerative changes.  No acute displaced osseous fracture is identified.             CONCLUSION:  Mild degenerative changes in lower lumbar spine.  This may be superimposed on a developmentally slender lumbar spinal canal. If the patient's symptoms persist or worsen, consider further assessment with MRI.   LOCATION:  [id number]   Dictated by (CST): Stromberg, LeRoy, MD on 2/16/2024 at 2:24 PM     Finalized by (CST): Stromberg, LeRoy, MD on 2/16/2024 at 2:27 PM       XR CHEST AP/PA (1 VIEW) (CPT=71045)    Result Date: 2/16/2024  PROCEDURE:  XR CHEST AP/PA (1 VIEW) (CPT=71045)  TECHNIQUE:  AP chest radiograph was obtained.  COMPARISON:  None.  INDICATIONS:  Back spasms after lifting heavy item  PATIENT STATED HISTORY: (As transcribed by Technologist)  Patient twisted his back picking up a heavy laundry basket earlier today, has been having back spasms since.             CONCLUSION:  Elevation of the right hemidiaphragm.  Normal heart size and pulmonary vascularity.  No focal infiltrate, consolidation, effusion or pneumothorax.  LOCATION:  [id number]      Dictated by (CST): Amie Mahmood MD on 2/16/2024 at 2:08 PM     Finalized by (CST): Amie Mahmood MD on 2/16/2024 at 2:09 PM               [facility]      Differential Diagnosis  43-year-old male presents today for evaluation of a right paraspinal back discomfort that he describes as a spasm.  He has reproducible tenderness on exam.  The pain seems to start over the right lower thorax and radiates downward.  He has normal strength with hip flexion, knee flexion/extension and bilateral plantar and dorsiflexion.  He has no saddle anesthesia.  Based off his history and exam, I do not suspect any spinal cord compressive etiology such as an epidural abscess or epidural hematoma.  With the location of his pain, I feel ruling out cardiopulmonary pathology is important.  Will obtain troponin assess for myocardial ischemia and D-dimer rule out pulm embolism.  Plan for  chest x-ray to assess for pneumothorax or consolidation suggestive of pneumonia.  Will obtain thoracic and lumbar films to assess for any acute bony abnormality.  Will give pain medicine and reassess.    2:36 pm  He says his pain is doing better on my reassessment.  Upon reviewing his low back, his pain seems to start over the right paraspinal muscles, from his lower ribs down to his lower lumbar paraspinal muscle.  With massaging, he has some improvement and relief.  When he goes back to a flat position, the spasm recurs and causes him some discomfort.  Will redose pain medicine.    3:27 pm  Patient still has significant discomfort with minimal movement.  A CT of the abdomen was added to assess for any retroperitoneal hematoma or ureterolithiasis.  Patient noted a previous contrast allergy.  He had 1 back in 2018 and he was given steroids along with Benadryl.  It was documented that he did not have any reaction.    5:34 pm  Patient tolerated the CT scan without issue.  The CT scan is unremarkable.  He still has significant discomfort with minimal movement.  Will admit for continued care, I spoke with the hospitalist regards to admission.    Discussions of Management  I spoke with regards to admission                                 Medical Decision Making      Disposition and Plan     Clinical Impression:  1. Acute right-sided low back pain, unspecified whether sciatica present         Disposition:  Admit  2/16/2024  5:33 pm    Follow-up:  No follow-up provider specified.        Medications Prescribed:  Current Discharge Medication List                            Hospital Problems       Present on Admission  Date Reviewed: 11/7/2023            ICD-10-CM Noted POA    * (Principal) Acute right-sided low back pain, unspecified whether sciatica present M54.50 2/16/2024 Unknown

## 2024-02-16 NOTE — ED QUICK NOTES
Patient unable to have HOB elevated d/t severe back pain. ED MD aware and to discuss plan of care with patient and family member.

## 2024-02-16 NOTE — ED QUICK NOTES
Orders for admission, patient is aware of plan and ready to go upstairs. Any questions, please call ED RN Pavan at extension [phone].     Patient Covid vaccination status: Fully vaccinated     COVID Test Ordered in ED: None    COVID Suspicion at Admission: N/A    Running Infusions:  None    Mental Status/LOC at time of transport: A&Ox4. Unable to ambulate d/t severe lower back pain.     Other pertinent information:   CIWA score: N/A   NIH score:  N/A

## 2024-02-17 ENCOUNTER — APPOINTMENT (OUTPATIENT)
Dept: MRI IMAGING | Facility: HOSPITAL | Age: 44
End: 2024-02-17
Attending: HOSPITALIST
Payer: COMMERCIAL

## 2024-02-17 LAB — POTASSIUM SERPL-SCNC: 4.3 MMOL/L (ref 3.5–5.1)

## 2024-02-17 PROCEDURE — 72146 MRI CHEST SPINE W/O DYE: CPT | Performed by: HOSPITALIST

## 2024-02-17 PROCEDURE — 99232 SBSQ HOSP IP/OBS MODERATE 35: CPT | Performed by: HOSPITALIST

## 2024-02-17 PROCEDURE — 99221 1ST HOSP IP/OBS SF/LOW 40: CPT

## 2024-02-17 PROCEDURE — 72148 MRI LUMBAR SPINE W/O DYE: CPT | Performed by: HOSPITALIST

## 2024-02-17 RX ORDER — METHYLPREDNISOLONE SODIUM SUCCINATE 125 MG/2ML
60 INJECTION, POWDER, LYOPHILIZED, FOR SOLUTION INTRAMUSCULAR; INTRAVENOUS EVERY 8 HOURS
Status: DISCONTINUED | OUTPATIENT
Start: 2024-02-17 | End: 2024-02-18

## 2024-02-17 RX ORDER — HYDROCODONE BITARTRATE AND ACETAMINOPHEN 5; 325 MG/1; MG/1
2 TABLET ORAL EVERY 4 HOURS PRN
Status: DISCONTINUED | OUTPATIENT
Start: 2024-02-17 | End: 2024-02-18

## 2024-02-17 RX ORDER — DIAZEPAM 5 MG/1
5 TABLET ORAL EVERY 6 HOURS PRN
Status: DISCONTINUED | OUTPATIENT
Start: 2024-02-17 | End: 2024-02-18

## 2024-02-17 RX ORDER — HYDROCODONE BITARTRATE AND ACETAMINOPHEN 5; 325 MG/1; MG/1
1 TABLET ORAL EVERY 4 HOURS PRN
Status: DISCONTINUED | OUTPATIENT
Start: 2024-02-17 | End: 2024-02-18

## 2024-02-17 NOTE — PLAN OF CARE
Patient admitted under Vanessa Rivera and Yuan. Oriented to room and procedure.Vitals stable, pain controlled. Awaiting MRI.

## 2024-02-17 NOTE — OCCUPATIONAL THERAPY NOTE
OCCUPATIONAL THERAPY EVALUATION - INPATIENT     Room Number: 372/372-A  Evaluation Date: 2/17/2024  Type of Evaluation: Initial  Presenting Problem: intractable back pain    Physician Order: IP Consult to Occupational Therapy  Reason for Therapy: ADL/IADL Dysfunction and Discharge Planning    OCCUPATIONAL THERAPY ASSESSMENT   Patient is currently functioning below baseline with lower body dressing, bed mobility, transfers, static standing balance, dynamic standing balance, and functional standing tolerance. Prior to admission, patient's baseline is independent without device, active.  Patient is requiring  SBA to MIN A for Adls and CGA to SBA for mobility/transfers with and without walker  as a result of the following impairments: pain and impaired standing balance. Occupational Therapy will continue to follow for duration of hospitalization.    Patient will benefit from continued skilled OT Services For duration of hospitalization, however, given the patient is functioning near baseline level do not anticipate skilled therapy needs at discharge       History Related to Current Admission: Patient is a 43 year old male admitted on 2/16/2024 from home with intractable back pain. Per nsgy and hospitalist, cleared to mobilize with therapy at time of eval, imaging as below.     Imaging:  MRI Thoracic + Lumbar spine 2/17  1. No acute process.   2. No significant disc disease within the thoracic spine.   3. Within the lumbar spine, there are degenerative disc changes primarily at L1-2, L4-5 and L5-S1.  Disease is most pronounced at L4-5 where there is mild central canal stenosis and mild bilateral subarticular stenosis.   4. Normal appearance to the visualized aspects of the spinal cord, conus and cauda equina.   5. The paraspinal soft tissues reveal no significant disease.     XR Thoracic spine 2/16  Mild endplate degenerative changes in the thoracic spine.  No acute displaced osseous fracture is identified.     XR Lumbar  spine 2/16  Mild degenerative changes in lower lumbar spine.  This may be superimposed on a developmentally slender lumbar spinal canal. If the patient's symptoms persist or worsen, consider further assessment with MRI.     WEIGHT BEARING RESTRICTION  Weight Bearing Restriction: None                Recommendations for nursing staff:   Transfers: SBA  Toileting location: commode over toilet for elevated height     EVALUATION SESSION:  Patient Start of Session: semi-supine   FUNCTIONAL TRANSFER ASSESSMENT  Sit to Stand: Edge of Bed; Chair  Edge of Bed: Contact Guard Assist  Chair: Stand-by Assist (progressed to SBA for sit<>stand from chair surface)  Toilet Transfer: Stand-by Assist (discussed technique for sit<>stand and stand<>sit from low SHT surface, simulated with chair at A)    BED MOBILITY  Rolling: Supervision  Supine to Sit : Supervision  Scooting: IND    BALANCE ASSESSMENT  Static Sitting: Independent  Sitting Bilateral: Independent  Static Standing: Independent  Standing Bilateral: Supervision    FUNCTIONAL ADL ASSESSMENT  LB Dressing Seated: Minimal Assist (education given re: adaptive techniques to maintain neutral spine during LB ADLs)      ACTIVITY TOLERANCE: Pt on room air and denies SOB, dizziness or lightheadedness throughout session. No significant change in vitals noted. Primary limiting factor is pain.                         O2 SATURATIONS       COGNITION  Overall Cognitive Status:  WFL - within functional limits    Upper Extremity   Strength not formally tested d/t back pain but no deficits noted and ROM/strength/coordination appear intact. Denies any n/t.     EDUCATION PROVIDED  Patient: Role of Occupational Therapy; Plan of Care; DME Recommendations; Functional Transfer Techniques; Fall Prevention; Posture/Positioning; Compensatory ADL Techniques; Proper Body Mechanics  Patient's Response to Education: Verbalized Understanding; Returned Demonstration    Equipment used: RW  Demonstrates  functional use     Therapist comments: Pt is pleasant and motivated to participate in therapy. Reporting improvement with pain control and spasms compared to yesterday and this morning. Education given re: spinal precautions for comfort and integration into ADLs and mobility, log rolling, compensatory strategies for ADLs. Pt agreeable to all recommendations and demonstrates good understanding. Initially completing mobility with RW, able to progress to no device with slower pace. Encouraged Pt to ambulate 2 more times today with nursing staff and change positioned frequently, every hour unless sleeping.     Patient End of Session: Up in chair;Needs met;Call light within reach;RN aware of session/findings;All patient questions and concerns addressed;With  staff;Family present    OCCUPATIONAL PROFILE    HOME SITUATION  Type of Home: House  Home Layout: Two level  Lives With: Spouse (2 children, ages 7 & 8)    Toilet and Equipment: Standard height toilet     Other Equipment: None    Occupation/Status: consultant for [employer]     Drives: Yes       Prior Level of Function: Pt lives with his spouse and two children in two level home. Pt is typically independent with all aspects of mobility and self-cares without device. Pt is very active, playing basketball, tennis, running.     SUBJECTIVE   \"Do you want the good story or the boring one? I was lifting a laundry basket.\"    PAIN ASSESSMENT  Ratin  Location: back  Management Techniques: Body mechanics;Activity promotion;Breathing techniques;Relaxation;Repositioning;Heat    OBJECTIVE  Precautions: Spine  Fall Risk: Standard fall risk      ASSESSMENTS    AM-PAC ‘6-Clicks’ Inpatient Daily Activity Short Form  -   Putting on and taking off regular lower body clothing?: A Little  -   Bathing (including washing, rinsing, drying)?: A Little  -   Toileting, which includes using toilet, bedpan or urinal? : None  -   Putting on and taking off regular upper body clothing?:  None  -   Taking care of personal grooming such as brushing teeth?: None  -   Eating meals?: None    AM-PAC Score:  Score: 22  Approx Degree of Impairment: 25.8%  Standardized Score (AM-PAC Scale): 47.1    ADDITIONAL TESTS     NEUROLOGICAL FINDINGS      COGNITION ASSESSMENTS       PLAN  OT Treatment Plan: Energy conservation/work simplification techniques;ADL training;IADL training;Functional transfer training;Patient/Family education;Patient/Family training;Compensatory technique education  Rehab Potential : Good  Frequency: 3-5x/week  Number of Visits to Meet Established Goals: 1    ADL Goals   Patient will perform lower body dressing:  with modified independent  Patient will perform toileting: with modified independent    Functional Transfer Goals  Patient will transfer from sit to supine:  with modified independent  Patient will transfer from supine to sit:  with modified independent  Patient will transfer from sit to stand:  with modified independent  Patient will transfer to toilet:  with modified independent    Additional Goals  Pt will verbalize precautions and independently adhere to precautions during ADL tasks    Patient Evaluation Complexity Level:   Occupational Profile/Medical History LOW - Brief history including review of medical or therapy records    Specific performance deficits impacting engagement in ADL/IADL LOW  1 - 3 performance deficits    Client Assessment/Performance Deficits MODERATE - Comorbidities and min to mod modifications of tasks    Clinical Decision Making LOW - Analysis of occupational profile, problem-focused assessments, limited treatment options    Overall Complexity LOW     OT Session Time: 25 minutes  Self-Care Home Management: 15 minutes  Therapeutic Activity: 5 minutes

## 2024-02-17 NOTE — PROGRESS NOTES
[facility]     Hospitalist Progress Note     Rolando Byers Patient Status:  Inpatient    1980 MRN [id number]   [facility] 3SW-A Attending Dax Peraza MD   Hosp Day # 1 PCP Deon Bella MD     Chief Complaint: back pain     Subjective:     Patient with continued intractable back pain w/ minimal movement this AM. Did improve quite a bit w/ valium, dilaudid, steroids. Was able to sit up now and tried to stand but unable.    Objective:    Review of Systems:   A comprehensive review of systems was completed; pertinent positive and negatives stated in subjective.    Vital signs:  Temp:  [97.2 °F (36.2 °C)-98.6 °F (37 °C)] 98.4 °F (36.9 °C)  Pulse:  [61-96] 96  Resp:  [12-20] 17  BP: ()/(62-84) 123/80  SpO2:  [60 %-100 %] 98 %    Physical Exam:    General: No acute distress  Respiratory: No wheezes, no rhonchi  Cardiovascular: S1, S2, regular rate and rhythm  Abdomen: Soft, Non-tender, non-distended, positive bowel sounds  Neuro: No new focal deficits.   Extremities: No edema      Diagnostic Data:    Labs:  Recent Labs   Lab 24  1230   WBC 4.0   HGB 13.9   MCV 86.7   .0       Recent Labs   Lab 24  1230 24  0235   GLU 84  --    BUN 13  --    CREATSERUM 0.70  --    CA 7.8*  --    ALB 3.2*  --      --    K 3.0* 4.3   *  --    CO2 26.0  --    ALKPHO 43*  --    AST 20  --    ALT 17  --    BILT 0.8  --    TP 6.3*  --        Estimated Creatinine Clearance: 147.8 mL/min (based on SCr of 0.7 mg/dL).    Recent Labs   Lab 24  1230   TROPHS 3       No results for input(s): \"PTP\", \"INR\" in the last 168 hours.               Microbiology    No results found for this visit on 24.      Imaging: Reviewed in Epic.    Medications:    methylPREDNISolone  60 mg Intravenous Q8H    [Held by provider] Tofacitinib Citrate  5 mg Oral BID    sennosides  17.2 mg Oral Daily       Assessment & Plan:      # Intractable back pain-very severe on exam  -No acute neurological  changes or incontinence.  -trial norco, valium PO. Can dc flexeril. IV dilaudid prn   -start IV solumedrol. Likely DC on prednisone  -CT abdomen and MRI reviewed, no severe findings. Mild disc bulg  -NS following  -PT/OT - will see tomorrow when improved a bit more     #Ulcerative colitis#-controlled on PTA medication  # Hypokalemia-replace         Dax Peraza MD    Supplementary Documentation:     Quality:  DVT Mechanical Prophylaxis:     Early ambuation  DVT Pharmacologic Prophylaxis   Medication   None                Code Status: Not on file  Noel: No urinary catheter in place  Noel Duration (in days):   Central line:    ZION:     Discharge is dependent on: course  At this point Mr. Byers is expected to be discharge to: home    The 21st Century Cures Act makes medical notes like these available to patients in the interest of transparency. Please be advised this is a medical document. Medical documents are intended to carry relevant information, facts as evident, and the clinical opinion of the practitioner. The medical note is intended as peer to peer communication and may appear blunt or direct. It is written in medical language and may contain abbreviations or verbiage that are unfamiliar.

## 2024-02-17 NOTE — PROGRESS NOTES
Occupational Therapy    Orders received and chart review completed. Pt admitted with intractable back pain. MRI T/L spine pending and neurosurgery consulted. Will await imaging and nsgy input prior to initiating therapy.

## 2024-02-17 NOTE — PLAN OF CARE
Pt Aox4, RA, spot check pulse ox. IV SL. Voiding per urinal. Tolerating diet. Medicated with Norco, Valium for pain with adequate relief. Continues to report pain with movement to R back. IV Solumedrol started today. Ambulating with x1 assist. MRI back completed. Await nsgy further recommendations.

## 2024-02-17 NOTE — PHYSICAL THERAPY NOTE
PT orders received. Chart reviewed. Pt admitted with intractable back pain. MRI T/L spine pending and neurosurgery consulted. Will hold on initiation of therapy until imaging completed with neurosurgery input. RN aware.

## 2024-02-17 NOTE — CONSULTS
[facility]  [facility] Neurosurgery Consult    Rolando Byers Patient Status:  Inpatient    1980 MRN [id number]   Location [facility] 3SW-A Attending Dax Peraza MD   Hosp Day # 1 PCP Deon Bella MD     REASON FOR CONSULTATION:  Acute lumbar back pain without radiculopathy    HISTORY OF PRESENT ILLNESS     Rolando Byers is a pleasant 43 year old male with PMH of ulcerative colitis who presented to ED with intractable low back pain. Patient reports onset of symptoms yesterday upon moving a laundry basket. He sat down for a work meeting and subsequently developed severe spasms to the lower back, which left him unable to stand or move. He took a muscle relaxant to no relief, thus called EMS for transport to ED due to severity of pain. Currently, pt denies radiating leg pain. Denies numbness, tingling or weakness of lower extremities. Denies bowel or bladder incontinence or retention. He is unable to sit up straight or stand/bear weight without significant pain.    PAST MEDICAL HISTORY     Past Medical History:   Diagnosis Date    Bloating 20    Calculus of kidney 2018    Fatigue 20    Flatulence/gas pain/belching 20    Sleep disturbance 20    I wake up during sleep due to bloating    UC (ulcerative colitis) (HCC)      PAST SURGICAL HISTORY:  Past Surgical History:   Procedure Laterality Date    APPENDECTOMY       FAMILY HISTORY:  family history includes Heart Disease in his father; Hypertension in his mother.    SOCIAL HISTORY:   reports that he has never smoked. He has never used smokeless tobacco. He reports that he does not currently use alcohol after a past usage of about 4.0 standard drinks of alcohol per week. He reports that he does not use drugs.    ALLERGIES     Allergies   Allergen Reactions    Iodine (Topical) HIVES    Radiology Contrast Iodinated Dyes RASH       MEDICATIONS     Medications Prior to Admission   Medication Sig Dispense Refill Last Dose    XELJANZ 5 MG Oral Tab  Take 1 tablet by mouth 2 (two) times daily. 60 tablet 11 2/16/2024     Current Facility-Administered Medications   Medication Dose Route Frequency    HYDROcodone-acetaminophen (Norco) 5-325 MG per tab 1 tablet  1 tablet Oral Q4H PRN    Or    HYDROcodone-acetaminophen (Norco) 5-325 MG per tab 2 tablet  2 tablet Oral Q4H PRN    [Held by provider] Tofacitinib Citrate TABS 5 mg *Patient Supplied*  5 mg Oral BID    acetaminophen (Tylenol) tab 650 mg  650 mg Oral Q4H PRN    Or    acetaminophen-codeine (Tylenol #3) 300-30 MG per tab 1 tablet  1 tablet Oral Q4H PRN    Or    acetaminophen-codeine (Tylenol #3) 300-30 MG per tab 2 tablet  2 tablet Oral Q4H PRN    HYDROmorphone (Dilaudid) 1 MG/ML injection 0.2 mg  0.2 mg Intravenous Q2H PRN    Or    HYDROmorphone (Dilaudid) 1 MG/ML injection 0.4 mg  0.4 mg Intravenous Q2H PRN    Or    HYDROmorphone (Dilaudid) 1 MG/ML injection 0.8 mg  0.8 mg Intravenous Q2H PRN    melatonin tab 3 mg  3 mg Oral Nightly PRN    ondansetron (Zofran) 4 MG/2ML injection 4 mg  4 mg Intravenous Q6H PRN    polyethylene glycol (PEG 3350) (Miralax) 17 g oral packet 17 g  17 g Oral Daily PRN    sennosides (Senokot) tab 17.2 mg  17.2 mg Oral Daily    bisacodyl (Dulcolax) 10 MG rectal suppository 10 mg  10 mg Rectal Daily PRN    fleet enema (Fleet) 7-19 GM/118ML rectal enema 133 mL  1 enema Rectal Once PRN    methocarbamol (Robaxin) tab 500 mg  500 mg Oral TID PRN     REVIEW OF SYSTEMS     Comprehensive Review of Systems obtained, and is negative other than that mentioned in the History of Present Illness.      PHYSICAL EXAMINATION     VITALS: /80 (BP Location: Right arm)   Pulse 96   Temp 98.4 °F (36.9 °C) (Oral)   Resp 17   Ht 72\"   Wt 183 lb 6.8 oz (83.2 kg)   SpO2 98%   BMI 24.88 kg/m²     GENERAL:  No acute distress, non-toxic appearing, speech fluent, mood appropriate    HEENT:  Normocephalic, atraumatic    RESP: Non-labored, easy, even    CV: NSR on tele    NEUROLOGICAL:  Alert and  oriented x 3.  Sensation to light touch is intact bilaterally.  LAL x 4. Gait deferred.       LOWER EXTREMITY STRENGTH:    Iliospoas  Hamstrings  Quads  D-flexion  P-flexion EHL     Right 5 5 5 5 5 5     Left 5 5 5 5 5 5     DIAGNOSTIC DATA     Lab Results   Component Value Date    WBC 4.0 02/16/2024    HGB 13.9 02/16/2024    HCT 40.4 02/16/2024    .0 02/16/2024    CREATSERUM 0.70 02/16/2024    BUN 13 02/16/2024     02/16/2024    K 4.3 02/17/2024     02/16/2024    CO2 26.0 02/16/2024    GLU 84 02/16/2024    CA 7.8 02/16/2024    ALB 3.2 02/16/2024    ALKPHO 43 02/16/2024    BILT 0.8 02/16/2024    TP 6.3 02/16/2024    AST 20 02/16/2024    ALT 17 02/16/2024    DDIMER <0.27 02/16/2024       IMAGING     CT ABDOMEN+PELVIS(CONTRAST ONLY)(CPT=74177)    Result Date: 2/16/2024  CONCLUSION:  No CT evidence for acute inflammatory process in the abdomen or pelvis.  No nephrolithiasis or hydronephrosis.  No retroperitoneal hematoma.  LOCATION:  [id number]   Dictated by (CST): Amie Mahmood MD on 2/16/2024 at 5:17 PM     Finalized by (CST): Amie Mahmood MD on 2/16/2024 at 5:20 PM       XR THORACIC SPINE (3 VIEWS) (CPT=72072)    Result Date: 2/16/2024  CONCLUSION:  See above.   LOCATION:  [id number]    Dictated by (CST): Stromberg, LeRoy, MD on 2/16/2024 at 2:27 PM     Finalized by (CST): Stromberg, LeRoy, MD on 2/16/2024 at 2:29 PM       XR LUMBAR SPINE (MIN 4 VIEWS) (CPT=72110)    Result Date: 2/16/2024  CONCLUSION:  Mild degenerative changes in lower lumbar spine.  This may be superimposed on a developmentally slender lumbar spinal canal. If the patient's symptoms persist or worsen, consider further assessment with MRI.   LOCATION:  [id number]   Dictated by (CST): Stromberg, LeRoy, MD on 2/16/2024 at 2:24 PM     Finalized by (CST): Stromberg, LeRoy, MD on 2/16/2024 at 2:27 PM       XR CHEST AP/PA (1 VIEW) (CPT=71045)    Result Date: 2/16/2024  CONCLUSION:  Elevation of the right hemidiaphragm.  Normal heart size and  pulmonary vascularity.  No focal infiltrate, consolidation, effusion or pneumothorax.  LOCATION:  [id number]      Dictated by (CST): Amie Mahmood MD on 2/16/2024 at 2:08 PM     Finalized by (CST): Amie Mahmood MD on 2/16/2024 at 2:09 PM         ASSESSMENT & PLAN     ASSESSMENT:  Acute lumbar back pain without radiculopathy    PLAN:  No acute surgical intervention is indicated at this time  MRI thoracic + lumbar spine pending. Further recommendations to follow imaging.  Pain medications and medical management per hospitalist   Vonda to work with PT/OT from a NSGY standpoint    Dr. Byrnes to follow.    Opal Dial, CHAY-NP  [facility]  2/17/2024 8:05 AM     Total visit time: 10 minutes; More than 50% spent coordinating care, counseling, reviewing imaging and discussing medication therapy.   Is this a shared or split note between Advanced Practice Provider and Physician? Yes

## 2024-02-17 NOTE — PLAN OF CARE
Patient A/O x4, VSS on RA, c/o mod-severe pain. , SCDs. Voiding freely via urinal, last BM 2/16. MRI T/L spine ordered. Plan for PT/OT eval and neurosx to see. Safety measures in place.

## 2024-02-17 NOTE — PHYSICAL THERAPY NOTE
PHYSICAL THERAPY EVALUATION - INPATIENT     Room Number: 372/372-A  Evaluation Date: 2/17/2024  Type of Evaluation: Initial  Physician Order: PT Eval and Treat    Presenting Problem: intractable back pain  Co-Morbidities : ulcerative colitis  Reason for Therapy: Mobility Dysfunction and Discharge Planning    Imaging Results:   XRAY LUMBAR SPINE:  Mild degenerative changes in lower lumbar spine.  This may be superimposed on a developmentally slender lumbar spinal canal. If the patient's symptoms persist or worsen, consider further assessment with MRI.    XRAY THORACIC SPINE:   Mild endplate degenerative changes in the thoracic spine.  No acute displaced osseous fracture is identified.      MRI LUMBAR SPINE: -results were not received at time of PT eval-   1. No acute process.   2. No significant disc disease within the thoracic spine.   3. Within the lumbar spine, there are degenerative disc changes primarily at L1-2, L4-5 and L5-S1.  Disease is most pronounced at L4-5 where there is mild central canal stenosis and mild bilateral subarticular stenosis.   4. Normal appearance to the visualized aspects of the spinal cord, conus and cauda equina.   5. The paraspinal soft tissues reveal no significant disease.    PHYSICAL THERAPY ASSESSMENT   Patient is currently functioning below baseline with bed mobility, transfers, gait, stair negotiation, maintaining seated position, standing prolonged periods, and performing household tasks.  Prior to admission, patient's baseline is independent and very active.  Patient is requiring stand-by assist and contact guard assist as a result of the following impairments: decreased functional strength, decreased endurance/aerobic capacity, pain, impaired static and dynamic standing balance, impaired coordination, impaired motor planning, and decreased muscular endurance.  Physical Therapy will continue to follow for duration of hospitalization.    Patient will benefit from continued  skilled PT Services For duration of hospitalization, however, given the patient is functioning near baseline level do not anticipate skilled therapy needs at discharge .    PLAN  PT Treatment Plan: Bed mobility;Body mechanics;Coordination;Endurance;Energy conservation;Patient education;Family education;Gait training;Neuromuscular re-educate;Range of motion;Strengthening;Stair training;Transfer training;Balance training;Stoop training  Rehab Potential : Good  Frequency (Obs): Daily  Number of Visits to Meet Established Goals: 2      CURRENT GOALS    Goal #1 Patient is able to demonstrate supine - sit EOB @ level: independent     Goal #2 Patient is able to demonstrate transfers Sit to/from Stand at assistance level: independent     Goal #3 Patient is able to ambulate 50 feet with assist device:  LRAD  at assistance level: modified independent     Goal #4 Pt able to ambulate 150 feet w LRAD at [place].   Goal #5 Pt able to ascend/descend 8 steps with use of rail at supervision.   Goal #6    Goal Comments: Goals established on 2/17/2024    PHYSICAL THERAPY MEDICAL/SOCIAL HISTORY  History related to current admission: Patient is a 43 year old male admitted on 2/16/2024 from home for intractable back pain.    HOME SITUATION  Type of Home: House   Home Layout: Two level;Bed/bath upstairs  Stairs to Enter : 3  Railing: Yes  Stairs to Bedroom: 12  Railing: Yes    Lives With: Spouse (2 children; 7 and 9 yo)  Drives: Yes  Patient Owned Equipment: Rolling walker  Patient Regularly Uses: None    Prior Level of [place]: Per pt - lives in two story home with spouse and 2 children (7 and 9 yo). Very active- enjoys playing basketball, tennis, and running. No hx of falls. Drives. 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     SUBJECTIVE  \"Do you want to know the fun story or boring story?   - \"I was deadlifting 300 lbs, no I actually was lifting a laundry basket. \"    OBJECTIVE  Precautions: Spine  Fall Risk: Standard fall  risk    WEIGHT BEARING RESTRICTION  Weight Bearing Restriction: None                PAIN ASSESSMENT  Rating: Unable to rate  Location: right lower back  Management Techniques: Activity promotion;Body mechanics;Breathing techniques;Repositioning;Other (Comment) (heat and /or ice)    COGNITION  Overall Cognitive Status:  WFL - within functional limits    RANGE OF MOTION AND STRENGTH ASSESSMENT  Upper extremity ROM and strength are within functional limits   Lower extremity ROM is within functional limits   Lower extremity strength is within functional limits     BALANCE  Static Sitting: Good  Dynamic Sitting: Fair +  Static Standing: Fair  Dynamic Standing: Fair -    ADDITIONAL TESTS                                    ACTIVITY TOLERANCE                         O2 WALK       NEUROLOGICAL FINDINGS                        AM-PAC '6-Clicks' INPATIENT SHORT FORM - BASIC MOBILITY  How much difficulty does the patient currently have...  Patient Difficulty: Turning over in bed (including adjusting bedclothes, sheets and blankets)?: A Little   Patient Difficulty: Sitting down on and standing up from a chair with arms (e.g., wheelchair, bedside commode, etc.): A Little   Patient Difficulty: Moving from lying on back to sitting on the side of the bed?: A Little   How much help from another person does the patient currently need...   Help from Another: Moving to and from a bed to a chair (including a wheelchair)?: A Little   Help from Another: Need to walk in hospital room?: A Little   Help from Another: Climbing 3-5 steps with a railing?: A Little       AM-PAC Score:  Raw Score: 18   Approx Degree of Impairment: 46.58%   Standardized Score (AM-PAC Scale): 43.63   CMS Modifier (G-Code): CK    FUNCTIONAL ABILITY STATUS  Gait Assessment   Functional Mobility/Gait Assessment  Gait Assistance: Supervision;Contact guard assist  Distance (ft): 100, 150  Assistive Device: Rolling walker;None  Stairs: Stairs  How Many Stairs: 4  Device: 1  Rail  Assist: Contact guard assist  Pattern: Ascend and Descend  Ascend and Descend : Step to    Skilled Therapy Provided     Bed Mobility:  Rolling: educated on use of log roll technique- completed at supervision w/ verbal cues for sequencing  Sidelying to sitting EOB: supervision with incr time    Sit to supine: NT     Transfer Mobility:  Sit to stand: supervision to RW- v/c for hand placement, incr time to reach erect posture   Stand to sit: supervision  Gait = initial CGA progressed to supervision w/ RW x 100 feet. Ambulated 150 feet w/o device, shorter step length. Decr flaquita.     Therapist's Comments:      Patient presents sitting up in bed. Discussed role and goal of physical therapy in hospital setting. Pt in agreement to session. Pt reporting improvements with back pain and less spasms. Reports at rest comfortable but with any movement incr in pain and R side spasms. Educated on spine precautions and implications on A/IDLs, as well as use of log roll technique for bed mobility.     Completed all tasks at CGA to supervision level today. Ambulated w/ RW and w/o RW- either way pt steady with or without. Pt did not demonstrate imbalances while ambulating. Able to stand at RW and reach single leg stance bilaterally without significant reports in pain. Ambulated 100 w/ RW, 150 feet w/o RW. Ascended/descended 4 steps with use of rail, incr difficulty descending due to pain. Upright in chair at end of session. Educated on importance of continued out of bed mobility and encouraged ambulation with nursing staff at least a few more times today, and 3-4 times daily. Encouraged use of ice or heat for pain alleviation- pt requesting heat at this time. RN aware.     Exercise/Education Provided:  Bed mobility  Body mechanics  Energy conservation  Functional activity tolerated  Gait training  Posture  Transfer training    Patient End of Session: Up in chair;With  staff;Needs met;Call light within reach;RN aware of  session/findings;All patient questions and concerns addressed;Family present;Discussed recommendations with /    Patient Evaluation Complexity Level:  History Moderate - 1 or 2 personal factors and/or co-morbidities   Examination of body systems Low - addressing 1-2 elements   Clinical Presentation Low - Stable   Clinical Decision Making Low - Stable     PT Session Time: 28 minutes  Gait Training: 15 minutes

## 2024-02-18 VITALS
HEART RATE: 68 BPM | DIASTOLIC BLOOD PRESSURE: 71 MMHG | WEIGHT: 183.44 LBS | TEMPERATURE: 97 F | SYSTOLIC BLOOD PRESSURE: 116 MMHG | RESPIRATION RATE: 18 BRPM | HEIGHT: 72 IN | OXYGEN SATURATION: 90 % | BODY MASS INDEX: 24.85 KG/M2

## 2024-02-18 PROCEDURE — 99232 SBSQ HOSP IP/OBS MODERATE 35: CPT | Performed by: STUDENT IN AN ORGANIZED HEALTH CARE EDUCATION/TRAINING PROGRAM

## 2024-02-18 PROCEDURE — 99239 HOSP IP/OBS DSCHRG MGMT >30: CPT | Performed by: HOSPITALIST

## 2024-02-18 RX ORDER — DIAZEPAM 5 MG/1
5 TABLET ORAL EVERY 6 HOURS PRN
Qty: 30 TABLET | Refills: 0 | Status: SHIPPED | OUTPATIENT
Start: 2024-02-18 | End: 2024-02-18

## 2024-02-18 RX ORDER — HYDROCODONE BITARTRATE AND ACETAMINOPHEN 5; 325 MG/1; MG/1
1 TABLET ORAL EVERY 4 HOURS PRN
Qty: 30 TABLET | Refills: 0 | Status: SHIPPED | OUTPATIENT
Start: 2024-02-18 | End: 2024-02-18

## 2024-02-18 RX ORDER — PREDNISONE 10 MG/1
TABLET ORAL
Qty: 20 TABLET | Refills: 0 | Status: SHIPPED | OUTPATIENT
Start: 2024-02-18 | End: 2024-02-26

## 2024-02-18 RX ORDER — HYDROCODONE BITARTRATE AND ACETAMINOPHEN 5; 325 MG/1; MG/1
1 TABLET ORAL EVERY 4 HOURS PRN
Qty: 30 TABLET | Refills: 0 | Status: SHIPPED | OUTPATIENT
Start: 2024-02-18 | End: 2024-03-01

## 2024-02-18 RX ORDER — PREDNISONE 10 MG/1
TABLET ORAL
Qty: 20 TABLET | Refills: 0 | Status: SHIPPED | OUTPATIENT
Start: 2024-02-18 | End: 2024-02-18

## 2024-02-18 RX ORDER — DIAZEPAM 5 MG/1
5 TABLET ORAL EVERY 6 HOURS PRN
Qty: 30 TABLET | Refills: 0 | Status: SHIPPED | OUTPATIENT
Start: 2024-02-18 | End: 2024-03-01

## 2024-02-18 NOTE — PLAN OF CARE
NURSING DISCHARGE NOTE    Discharged Home via Ambulatory.  Accompanied by Spouse  Belongings Taken by patient/family.    Dc instructions and f/u info reviewed with pt. Pt verbalized understanding. Scripts for Valium, norco, medrol dose pack sent to pts pharmacy.

## 2024-02-18 NOTE — PHYSICAL THERAPY NOTE
PHYSICAL THERAPY TREATMENT NOTE - INPATIENT    Room Number: 372/372-A     Session: 1     Number of Visits to Meet Established Goals: 2    Presenting Problem: intractable back pain  Co-Morbidities : ulcerative colitis    ASSESSMENT   Patient demonstrates excellent progress this session, goals  progressing with 5/5  met this session.    Patient continues to function near baseline with bed mobility, transfers, gait, and stair negotiation.  Contributing factors to remaining limitations include pain.  Pt able to advance distance ambulated and number of stairs at modified indep levels.  Given back pain, pt with increased time and altered quality, however able to maintain neutral spine as discussed to minimize irritation and pain.  Pt receptive to HEP which includes core strengthening and isometrics, position changes.  Physical Therapy will continue to follow patient for duration of hospitalization.    Patient continues to benefit from continued skilled PT services: at discharge to promote functional independence in home.  Anticipate patient will return home with OP PT.    PLAN  PT Treatment Plan: Bed mobility;Body mechanics;Coordination;Endurance;Energy conservation;Patient education;Family education;Gait training;Neuromuscular re-educate;Range of motion;Strengthening;Stair training;Transfer training;Balance training;Stoop training  Rehab Potential : Good  Frequency (Obs): Daily    CURRENT GOALS     Goal #1 Patient is able to demonstrate supine - sit EOB @ level: independent      Goal #2 Patient is able to demonstrate transfers Sit to/from Stand at assistance level: independent met at mod I levels      Goal #3 Patient is able to ambulate 50 feet with assist device:  LRAD  at assistance level: modified independent      Goal #4 Pt able to ambulate 150 feet w LRAD at [place].   Goal #5 Pt able to ascend/descend 8 steps with use of rail at supervision.   Goal #6     Goal Comments: Goals established on 2/17/2024 2/18/2024 all  goals achieved     SUBJECTIVE  Pt reports no spasms, minimal back pain, improved with ambulation, meds, and use of hot packs    OBJECTIVE  Precautions: Spine    WEIGHT BEARING RESTRICTION  Weight Bearing Restriction: None                PAIN ASSESSMENT   Rating: Unable to rate  Location: right low back  Management Techniques: Activity promotion;Body mechanics;Breathing techniques;Repositioning;Relaxation    BALANCE                                                                                                                       Static Sitting: Good  Dynamic Sitting: Good           Static Standing: Good  Dynamic Standing: Fair +    ACTIVITY TOLERANCE                         O2 WALK         AM-PAC '6-Clicks' INPATIENT SHORT FORM - BASIC MOBILITY  How much difficulty does the patient currently have...  Patient Difficulty: Turning over in bed (including adjusting bedclothes, sheets and blankets)?: None   Patient Difficulty: Sitting down on and standing up from a chair with arms (e.g., wheelchair, bedside commode, etc.): None   Patient Difficulty: Moving from lying on back to sitting on the side of the bed?: None   How much help from another person does the patient currently need...   Help from Another: Moving to and from a bed to a chair (including a wheelchair)?: None   Help from Another: Need to walk in hospital room?: None   Help from Another: Climbing 3-5 steps with a railing?: None       AM-PAC Score:  Raw Score: 24   Approx Degree of Impairment: 0%   Standardized Score (AM-PAC Scale): 61.14   CMS Modifier (G-Code): CH    FUNCTIONAL ABILITY STATUS  Gait Assessment   Functional Mobility/Gait Assessment  Gait Assistance: Modified independent  Distance (ft): 500  Assistive Device: None  Stairs: Stairs  How Many Stairs: 12  Device: 1 Rail  Assist: Modified independent  Pattern: Ascend and Descend  Ascend and Descend : Step to    Skilled Therapy Provided      Transfer Mobility:  Sit<>Stand: initially sba with vcs for  breathing techniques and widen SERA to increase ease, pt able to return demo to mod I    Gait: 500ft without device, decreased trunk rotation, decreased speed, decreased step length B R>L  Stairs: x 12 with railing progressing to no railing reciprocal pattern mod I    Education: discussed neutral spine and positioning with pillow support in supine/sidelying/prone, discussed benefits of position changes hourly, especially during sitting and standing, therex instruction: isometric core, TA activation, posterior pelvic tilts with hold and gradual progression in sitting and prolonged holds, encouraged walking program, pt in agreement, verbalized understanding of all topics discussed      Patient End of Session: Up in chair;With  staff;Needs met;Call light within reach;RN aware of session/findings;All patient questions and concerns addressed    PT Session Time: 30  minutes  Gait Training: 15 minutes  Therapeutic Exercise: 15 minutes

## 2024-02-18 NOTE — PLAN OF CARE
Pt has good relief from Valium and Norco prn. Able now to ambulate with tolerable pain to rt lower back, denies n/t to all extremities. On IV Solumedrol q6 hrs. Plan for possible dc home tomorrow.

## 2024-02-18 NOTE — PLAN OF CARE
Pt Aox4, RA . Po norco and valium for pain. IV Solumedrol. Ambulating in halls with PT. Continues to report pain to R lower back but reports its much improved, declined pain meds. Plan to Dc home later today.

## 2024-02-18 NOTE — DISCHARGE SUMMARY
[place] HOSPITALIST  DISCHARGE SUMMARY     Rolando Byers Patient Status:  Inpatient    1980 MRN [id number]   Location [facility] 3SW-A Attending Dax Peraza MD   Hosp Day # 2 PCP Deon Bella MD     Date of Admission: 2024  Date of Discharge:   24    Discharge Disposition: Home or Self Care    Discharge Diagnosis:  # Intractable back pain-very severe on exam  -No acute neurological changes or incontinence.  -trial norco, valium PO. Can dc flexeril. IV dilaudid prn   -IV solumedrol. DC on prednisone  -CT abdomen and MRI reviewed, no severe findings. 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  Patient states he was moving a laundry basket earlier today and twisted the wrong way.  States it was in his right lower thoracic and lumbar area.  Continue to worsen throughout the day.  He sat down for work meeting which she continued to spasm.  The pain became 10 out of 10 he fell to the floor and could not get up.  He called his wife and subsequently needed paramedics to get him to the emergency department.  He has minimal mobility at this time even with passive range of motion due to the severity of the pain.  No fecal or urinary incontinence.  He has a history of ulcerative colitis no changes in medications.  He did try 1 Flexeril at home without any relief.  He denies chest pain or shortness of breath.     Brief Synopsis: pt w/ severe, intractable back pain from mechanical injury. CT and MRI w/o concerning findings. 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 Exercises given by NS. Pain improved w/ norco, steroids, and valium. Ok to DC home.     Lace+ Score: 30  59-90 High Risk  29-58 Medium Risk  0-28   Low Risk       TCM Follow-Up Recommendation:  LACE 29-58:  Moderate Risk of readmission after discharge from the hospital.      Procedures during hospitalization:   none    Incidental or significant findings and recommendations (brief descriptions):  none    Lab/Test results pending at Discharge:   none    Consultants:  NS    Discharge Medication List:     Discharge Medications        START taking these medications        Instructions Prescription details   diazePAM 5 MG Tabs  Commonly known as: Valium      Take 1 tablet (5 mg total) by mouth every 6 (six) hours as needed (muscle spasm).   Quantity: 30 tablet  Refills: 0     HYDROcodone-acetaminophen 5-325 MG Tabs  Commonly known as: Norco      Take 1 tablet by mouth every 4 (four) hours as needed.   Quantity: 30 tablet  Refills: 0     predniSONE 10 MG Tabs  Commonly known as: Deltasone  Start taking on: February 18, 2024      Take 4 tablets (40 mg total) by mouth daily for 2 days, THEN 3 tablets (30 mg total) daily for 2 days, THEN 2 tablets (20 mg total) daily for 2 days, THEN 1 tablet (10 mg total) daily for 2 days.   Stop taking on: February 26, 2024  Quantity: 20 tablet  Refills: 0            CONTINUE taking these medications        Instructions Prescription details   Xeljanz 5 MG Tabs  Generic drug: Tofacitinib Citrate      Take 1 tablet by mouth 2 (two) times daily.   Quantity: 60 tablet  Refills: 11               Where to Get Your Medications        [id number] - [place], [place] - [address], [phone], [phone]  [address] [address]      Phone: [phone]   diazePAM 5 MG Tabs  HYDROcodone-acetaminophen 5-325 MG Tabs  predniSONE 10 MG Tabs         [place]    Follow-up appointment:   Deon Bella MD  [address] [place] [address] [address]  [facility] [address] [phone]    Schedule an appointment as soon as possible for a visit      Anatoliy Byrnes MD  [address] [place] [address] 308  [facility]  [address]  [phone]    Follow up  As needed    Appointments for Next 30 Days 2024 - 3/19/2024      None            Vital signs:  Temp:  [97.3 °F (36.3 °C)-98.3 °F (36.8 °C)] 97.3 °F (36.3 °C)  Pulse:  [67-93] 68  Resp:  [16-18] 18  BP: ()/(56-72) 116/71  SpO2:  [90 %-100 %] 90 %    Physical Exam:    General: No acute distress   Lungs: clear to auscultation  Cardiovascular: S1, S2  Abdomen: Soft      -----------------------------------------------------------------------------------------------  PATIENT DISCHARGE INSTRUCTIONS: See electronic chart    Dax Peraza MD    Total time spent on discharge plannin minutes     The  [employer] Cures Act makes medical notes like these available to patients in the interest of transparency. Please be advised this is a medical document. Medical documents are intended to carry relevant information, facts as evident, and the clinical opinion of the practitioner. The medical note is intended as peer to peer communication and may appear blunt or direct. It is written in medical language and may contain abbreviations or verbiage that are unfamiliar.

## 2024-02-18 NOTE — PROGRESS NOTES
[facility]  Neurological Surgery Progress Note    Rolando Byers Patient Status:  Inpatient    1980 MRN [id number]   Location [facility] 3SW-A Attending Dax Peraza MD   Hosp Day # 2 PCP Deon Bella MD     Subjective:  No acute events. Doing much better. Standing at bedside after working with therapy. MRI obtained yesterday.     Objective:  Vital Signs:  Blood pressure 116/71, pulse 68, temperature 97.3 °F (36.3 °C), temperature source Oral, resp. rate 18, height 72\", weight 183 lb 6.8 oz (83.2 kg), SpO2 90%.  General: No acute distress.  Respiratory: Non-labored respirations bilaterally. No audible wheezing  Cardiovascular: Extremities warm and well-perfused.  Abdomen: Soft, nontender, nondistended.   Musculoskeletal: Moves all extremities well, symmetrically.  Extremities: No edema.  Neurologic:  A&Ox3   PERRL, EOMI   Face symmetric, no numbness   5/5 throughout, no drift   Sensation intact to light touch    Neurosurgical imaging independently reviewed:  MR lumbar 2024: Degenerative spondylosis throughout the lumbar spine. Of note, there are 6 lumbar vertebrae as also evidenced by x-ray and CT. No neural element compromise anywhere along the neuroaxis. Degenerative disc disease at L5-6, L6-S1, and L2-3.    Assessment & Plan:  Patient Active Problem List   Diagnosis    Ulcerative colitis, unspecified, without complications (HCC)    Ulcerative chronic pancolitis without complications (HCC)    Acute right-sided low back pain, unspecified whether sciatica present       Rolando Byers is a(n) 43 year old male with acute back pain consistent with MSK origin. No surgical lesions on imaging, no neural element compression and no symptoms of radiculopathy. Discussed anatomical considerations, namely the presence of 6 lumbar vertebra and the potential for Bartolotti's syndrome. Discussed management of spine health. Neurosurgery to sign off at this time. If follow up is  wanted, he may follow in the [place] office.     Anatoliy Byrnes MD  Neurological Surgery    [address] , [address] [address]  [phone]  Pager [phone]  2/18/2024 10:17 AM      This note was created using a voice-recognition transcribing system. Incorrect words or phrases may have been missed during proofreading. Please interpret accordingly.

## 2024-02-20 ENCOUNTER — PATIENT OUTREACH (OUTPATIENT)
Dept: CASE MANAGEMENT | Age: 44
End: 2024-02-20

## 2024-02-20 DIAGNOSIS — Z02.9 ENCOUNTERS FOR UNSPECIFIED ADMINISTRATIVE PURPOSE: ICD-10-CM

## 2024-02-20 DIAGNOSIS — M54.50 ACUTE RIGHT-SIDED LOW BACK PAIN, UNSPECIFIED WHETHER SCIATICA PRESENT: Primary | ICD-10-CM

## 2024-02-20 NOTE — PAYOR COMM NOTE
--------------  DISCHARGE REVIEW    Payor: [employer]  Subscriber #:  [id number]  Authorization Number: [id number]    Admit date: 24  Admit time:   6:07 PM  Discharge Date: 2024 12:45 PM     Admitting Physician: Colby Rivera MD  Attending Physician:  No att. providers found  Primary Care Physician: Deon Bella MD          Discharge Summary Notes        Discharge Summary signed by Dax Peraza MD at 2024 12:24 PM       Author: Dax Peraza MD Specialty: HOSPITALIST, Internal Medicine Author Type: Physician    Filed: 2024 12:24 PM Date of Service: 2024 12:21 PM Status: Signed    : Dax Peraza MD (Physician)           [facility]     Rolando Byers Patient Status:  Inpatient    1980 MRN [id number]   Location [facility] 3SW-A Attending Dax Peraza MD   Hosp Day # 2 PCP Deon Bella MD     Date of Admission: 2024  Date of Discharge:   24    Discharge Disposition: Home or Self Care    Discharge Diagnosis:  # Intractable back pain-very severe on exam  -No acute neurological changes or incontinence.  -trial norco, valium PO. Can dc flexeril. IV dilaudid prn   -IV solumedrol. DC on prednisone  -CT abdomen and MRI reviewed, no severe findings. 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  Patient states he was moving a laundry basket earlier today and twisted the wrong way.  States it was in his right lower thoracic and lumbar area.  Continue to worsen throughout the day.  He sat down for work meeting which she continued to spasm.  The pain became 10 out of 10 he fell to the floor and could not get up.  He called his wife and subsequently needed paramedics to get him to the emergency department.  He has minimal mobility  at this time even with passive range of motion due to the severity of the pain.  No fecal or urinary incontinence.  He has a history of ulcerative colitis no changes in medications.  He did try 1 Flexeril at home without any relief.  He denies chest pain or shortness of breath.     Brief Synopsis: pt w/ severe, intractable back pain from mechanical injury. CT and MRI w/o concerning findings. 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 Exercises given by NS. Pain improved w/ norco, steroids, and valium. Ok to DC home.     Lace+ Score: 30  59-90 High Risk  29-58 Medium Risk  0-28   Low Risk       TCM Follow-Up Recommendation:  LACE 29-58: Moderate Risk of readmission after discharge from the hospital.      Procedures during hospitalization:   none    Incidental or significant findings and recommendations (brief descriptions):  none    Lab/Test results pending at Discharge:   none    Consultants:  NS    Discharge Medication List:     Discharge Medications        START taking these medications        Instructions Prescription details   diazePAM 5 MG Tabs  Commonly known as: Valium      Take 1 tablet (5 mg total) by mouth every 6 (six) hours as needed (muscle spasm).   Quantity: 30 tablet  Refills: 0     HYDROcodone-acetaminophen 5-325 MG Tabs  Commonly known as: Norco      Take 1 tablet by mouth every 4 (four) hours as needed.   Quantity: 30 tablet  Refills: 0     predniSONE 10 MG Tabs  Commonly known as: Deltasone  Start taking on: February 18, 2024      Take 4 tablets (40 mg total) by mouth daily for 2 days, THEN 3 tablets (30 mg total) daily for 2 days, THEN 2 tablets (20 mg total) daily for 2 days, THEN 1 tablet (10 mg total) daily for 2 days.   Stop taking on: February 26, 2024  Quantity: 20 tablet  Refills: 0            CONTINUE taking these medications        Instructions Prescription details   Xeljanz 5 MG Tabs  Generic drug: Tofacitinib Citrate      Take 1 tablet by mouth 2  (two) times daily.   Quantity: 60 tablet  Refills: 11               Where to Get Your Medications        These medications were sent to [id number] - [place], [place] - [address] [facility], [phone], [phone]  [address], [facility] [address]      Phone: [phone]   diazePAM 5 MG Tabs  HYDROcodone-acetaminophen 5-325 MG Tabs  predniSONE 10 MG Tabs         [place]    Follow-up appointment:   Deon Bella MD   [address] [place] [address] [address]  [facility] [address] [phone]    Schedule an appointment as soon as possible for a visit      Anatoliy Byrnes MD  [address] [facility] [address]  [facility] [address] [phone]    Follow up  As needed    Appointments for Next 30 Days 2024 - 3/19/2024      None            Vital signs:  Temp:  [97.3 °F (36.3 °C)-98.3 °F (36.8 °C)] 97.3 °F (36.3 °C)  Pulse:  [67-93] 68  Resp:  [16-18] 18  BP: ()/(56-72) 116/71  SpO2:  [90 %-100 %] 90 %    Physical Exam:    General: No acute distress   Lungs: clear to auscultation  Cardiovascular: S1, S2  Abdomen: Soft      -----------------------------------------------------------------------------------------------  PATIENT DISCHARGE INSTRUCTIONS: See electronic chart    Dax Peraza MD    Total time spent on discharge plannin minutes     The  Cures Act makes medical notes like these available to patients in the interest of transparency. Please be advised this is a medical document. Medical documents are intended to carry relevant information, facts as evident, and the clinical opinion of the practitioner. The medical note is intended as peer to peer communication and may appear blunt or direct. It is written in medical language and may contain abbreviations or verbiage that are unfamiliar.     Electronically signed by Dax Peraza MD on 2024 12:24 PM         REVIEWER COMMENTS

## 2024-02-20 NOTE — PAYOR COMM NOTE
--------------  ADMISSION REVIEW     Payor: PHILLIP GARY [facility] PLAN  Subscriber #:  [id number]  Authorization Number: [id number]    Admit date: 2/16/24  Admit time:  6:07 PM       REVIEW DOCUMENTATION:     ED Provider Notes        ED Provider Notes signed by Levon Pedroza MD at 2/16/2024  5:57 PM       Author: Levon Pedroza MD Service: -- Author Type: Physician    Filed: 2/16/2024  5:57 PM Date of Service: 2/16/2024 12:52 PM Status: Signed    : Levon Pedroza MD (Physician)           Patient Seen in: [facility] Emergency Department      History     Chief Complaint   Patient presents with    Spasms     Stated Complaint: Back spasms after lifting heavy item    Subjective:   HPI    39-year-old male with past medical history of ulcerative colitis presents today for evaluation of a back pain.  Patient was moving a laundry basket and noticed a discomfort in his right thoracic back.  He sat down for a work meeting.  After the meeting, his back locked up and he felt a spasm.  He took a Flexeril at home without any improvement.  The pain intensified he was unable to get up because of the spasm in his back.  He denies any recent trauma.  He has no loss of bowel or bladder function.  He has no saddle anesthesia.  There is no reported chest pain or shortness of breath.    Objective:   Past Medical History:   Diagnosis Date    Bloating 11/1/20    Calculus of kidney 2018    Fatigue 11/1/20    Flatulence/gas pain/belching 11/1/20    Sleep disturbance 11/1/20    I wake up during sleep due to bloating    UC (ulcerative colitis) (HCC)               Past Surgical History:   Procedure Laterality Date    APPENDECTOMY                  Social History     Socioeconomic History    Marital status:    Tobacco Use    Smoking status: Never    Smokeless tobacco: Never   Vaping Use    Vaping Use: Never used   Substance and Sexual Activity    Alcohol use: Not Currently     Alcohol/week: 4.0  standard drinks of alcohol     Types: 2 Glasses of wine, 2 Standard drinks or equivalent per week     Comment: weekends; light intake    Drug use: No   Other Topics Concern    Caffeine Concern Yes     Comment: 1 cup daily    Exercise Yes     Comment: 2-3 times weekly              Review of Systems    Positive for stated complaint: Back spasms after lifting heavy item  Other systems are as noted in HPI.  Constitutional and vital signs reviewed.      All other systems reviewed and negative except as noted above.    Physical Exam     ED Triage Vitals [02/16/24 1231]   /78   Pulse 62   Resp 14   Temp 98 °F (36.7 °C)   Temp src    SpO2 100 %   O2 Device None (Room air)       Current:/84   Pulse 76   Temp 98 °F (36.7 °C)   Resp 16   Ht 182.9 cm (6')   Wt 88.5 kg   SpO2 100%   BMI 26.45 kg/m²         Physical Exam  Vitals and nursing note reviewed.   Constitutional:       Appearance: Normal appearance.   HENT:      Head: Normocephalic.      Nose: Nose normal.      Mouth/Throat:      Mouth: Mucous membranes are moist.   Eyes:      Extraocular Movements: Extraocular movements intact.   Cardiovascular:      Rate and Rhythm: Normal rate.   Pulmonary:      Effort: Pulmonary effort is normal.   Abdominal:      General: Abdomen is flat.   Musculoskeletal:         General: Normal range of motion.      Comments: No midline thoracolumbar erythema, step-off or deformity.  Right thoracic paraspinal tenderness and spasm.   Skin:     General: Skin is warm.   Neurological:      General: No focal deficit present.      Mental Status: He is alert.      Cranial Nerves: No cranial nerve deficit.      Sensory: No sensory deficit.      Motor: No weakness.      Coordination: Coordination normal.      Deep Tendon Reflexes: Reflexes normal.   Psychiatric:         Mood and Affect: Mood normal.           ED Course     Labs Reviewed   COMP METABOLIC PANEL (14) - Abnormal; Notable for the following components:       Result Value     Potassium 3.0 (*)     Chloride 114 (*)     Calcium, Total 7.8 (*)     Alkaline Phosphatase 43 (*)     Total Protein 6.3 (*)     Albumin 3.2 (*)     All other components within normal limits   TROPONIN I HIGH SENSITIVITY - Normal   D-DIMER - Normal   CBC WITH DIFFERENTIAL WITH PLATELET    Narrative:     The following orders were created for panel order CBC With Differential With Platelet.  Procedure                               Abnormality         Status                     ---------                               -----------         ------                     CBC W/ DIFFERENTIAL[[id number]]                              Final result                 Please view results for these tests on the individual orders.   URINALYSIS WITH CULTURE REFLEX   RAINBOW DRAW LAVENDER   RAINBOW DRAW LIGHT GREEN   RAINBOW DRAW BLUE   CBC W/ DIFFERENTIAL     EKG    Rate, intervals and axes as noted on EKG Report.  Rate: 68  Rhythm: Sinus Rhythm  Reading: no STEMI                 CT ABDOMEN+PELVIS(CONTRAST ONLY)(CPT=74177)    Result Date: 2/16/2024  PROCEDURE:  CT ABDOMEN+PELVIS (CONTRAST ONLY) (CPT=74177)  COMPARISON:  None.  INDICATIONS:  , retroperitoneal hematoma? ureterolithiasis?  TECHNIQUE:  CT scanning was performed from the dome of the diaphragm to the pubic symphysis with non-ionic intravenous contrast material. Post contrast coronal MPR imaging was performed.  Dose reduction techniques were used. Dose information is transmitted to the ACR (American College of Radiology) NRDR (National Radiology Data Registry) which includes the Dose Index Registry.  Patient was pre-medicated for contrast allergy without incident.  PATIENT STATED HISTORY:(As transcribed by Technologist)  Patient with lower back pain after lifting a clothes basket at home.   CONTRAST USED:  100cc of Isovue 370  FINDINGS:  LUNG BASE:  Atelectasis. LIVER:  Homogeneous enhancement. BILIARY:  No biliary ductal dilatation. PANCREAS:  Homogeneous enhancement. SPLEEN:   Normal caliber. KIDNEYS:  No hydronephrosis or suspicious renal mass.  There is a 1 cm cyst in the midpole of the left kidney.  No nephrolithiasis.  No obstructing urinary calculus. ADRENALS:  Normal. AORTA/VASCULAR:  No aneurysm. RETROPERITONEUM:  No enlarged adenopathy.  No retroperitoneal hematoma as question. BOWEL/MESENTERY:  Normal caliber bowel loops. Uncomplicated colonic diverticulosis ABDOMINAL WALL:  Small fat containing umbilical hernia.  PELVIC ORGANS:  Prostatic calcification.  BONES:  Mild degenerative changes in the lower lumbar facets.             CONCLUSION:  No CT evidence for acute inflammatory process in the abdomen or pelvis.  No nephrolithiasis or hydronephrosis.  No retroperitoneal hematoma.  LOCATION:  [id number]   Dictated by (CST): Amie Mahmood MD on 2/16/2024 at 5:17 PM     Finalized by (CST): Amie Mahmood MD on 2/16/2024 at 5:20 PM       XR THORACIC SPINE (3 VIEWS) (CPT=72072)    Result Date: 2/16/2024  PROCEDURE:  XR THORACIC SPINE (3 VIEWS) (CPT=72072)  TECHNIQUE:  AP, lateral, and swimmer's views of the thoracic spine were obtained.  COMPARISON:  None.  INDICATIONS:  Back spasms after lifting heavy item  PATIENT STATED HISTORY: (As transcribed by Technologist)  Patient twisted his back picking up a heavy laundry basket earlier today, has been having back spasms since.     FINDINGS:  Mild endplate degenerative changes in the thoracic spine.  No acute displaced osseous fracture is identified.             CONCLUSION:  See above.   LOCATION:  [id number]    Dictated by (CST): Stromberg, LeRoy, MD on 2/16/2024 at 2:27 PM     Finalized by (CST): Stromberg, LeRoy, MD on 2/16/2024 at 2:29 PM       XR LUMBAR SPINE (MIN 4 VIEWS) (CPT=72110)    Result Date: 2/16/2024  PROCEDURE:  XR LUMBAR SPINE (MIN 4 VIEWS) (CPT=72110)  TECHNIQUE:  AP, lateral, oblique, and coned down L5-S1 views were obtained.  COMPARISON:  [facility] , XR, XR LUMBAR SPINE (MIN 4 VIEWS) (CPT=72110), 12/27/2016, 3:45 PM.  INDICATIONS:   Back spasms after lifting heavy item  PATIENT STATED HISTORY: (As transcribed by Technologist)  Patient twisted his back picking up a heavy laundry basket earlier today, has been having back spasms since.     FINDINGS:  T12 may be non-rib-bearing.  Consider elective whole spine counting radiographs as clinically appropriate. There is straightening of the lumbar lordosis.  The lower lumbar spine may be developmentally slender.  There is mild degenerative disc disease at L5-S1.  There is mild facet arthropathy at L5-S1.  There are minimal endplate degenerative changes.  No acute displaced osseous fracture is identified.             CONCLUSION:  Mild degenerative changes in lower lumbar spine.  This may be superimposed on a developmentally slender lumbar spinal canal. If the patient's symptoms persist or worsen, consider further assessment with MRI.   LOCATION:  [id number]   Dictated by (CST): Stromberg, LeRoy, MD on 2/16/2024 at 2:24 PM     Finalized by (CST): Stromberg, LeRoy, MD on 2/16/2024 at 2:27 PM       XR CHEST AP/PA (1 VIEW) (CPT=71045)    Result Date: 2/16/2024  PROCEDURE:  XR CHEST AP/PA (1 VIEW) (CPT=71045)  TECHNIQUE:  AP chest radiograph was obtained.  COMPARISON:  None.  INDICATIONS:  Back spasms after lifting heavy item  PATIENT STATED HISTORY: (As transcribed by Technologist)  Patient twisted his back picking up a heavy laundry basket earlier today, has been having back spasms since.             CONCLUSION:  Elevation of the right hemidiaphragm.  Normal heart size and pulmonary vascularity.  No focal infiltrate, consolidation, effusion or pneumothorax.  LOCATION:  [id number]      Dictated by (CST): Amie Mahmood MD on 2/16/2024 at 2:08 PM     Finalized by (CST): Amie Mahmood MD on 2/16/2024 at 2:09 PM              [facility]      Differential Diagnosis  43-year-old male presents today for evaluation of a right paraspinal back discomfort that he describes as a spasm.  He has reproducible tenderness on exam.  The  pain seems to start over the right lower thorax and radiates downward.  He has normal strength with hip flexion, knee flexion/extension and bilateral plantar and dorsiflexion.  He has no saddle anesthesia.  Based off his history and exam, I do not suspect any spinal cord compressive etiology such as an epidural abscess or epidural hematoma.  With the location of his pain, I feel ruling out cardiopulmonary pathology is important.  Will obtain troponin assess for myocardial ischemia and D-dimer rule out pulm embolism.  Plan for chest x-ray to assess for pneumothorax or consolidation suggestive of pneumonia.  Will obtain thoracic and lumbar films to assess for any acute bony abnormality.  Will give pain medicine and reassess.    2:36 pm  He says his pain is doing better on my reassessment.  Upon reviewing his low back, his pain seems to start over the right paraspinal muscles, from his lower ribs down to his lower lumbar paraspinal muscle.  With massaging, he has some improvement and relief.  When he goes back to a flat position, the spasm recurs and causes him some discomfort.  Will redose pain medicine.    3:27 pm  Patient still has significant discomfort with minimal movement.  A CT of the abdomen was added to assess for any retroperitoneal hematoma or ureterolithiasis.  Patient noted a previous contrast allergy.  He had 1 back in 2018 and he was given steroids along with Benadryl.  It was documented that he did not have any reaction.    5:34 pm  Patient tolerated the CT scan without issue.  The CT scan is unremarkable.  He still has significant discomfort with minimal movement.  Will admit for continued care, I spoke with the hospitalist regards to admission.    Discussions of Management  I spoke with regards to admission                                 Medical Decision Making      Disposition and Plan     Clinical Impression:  1. Acute right-sided low back pain, unspecified whether sciatica present          Disposition:  Admit  2/16/2024  5:33 pm    Follow-up:  No follow-up provider specified.        Medications Prescribed:  Current Discharge Medication List                            Hospital Problems       Present on Admission  Date Reviewed: 11/7/2023            ICD-10-CM Noted POA    * (Principal) Acute right-sided low back pain, unspecified whether sciatica present M54.50 2/16/2024 Unknown                     Signed by Levon Pedroza MD on 2/16/2024  5:57 PM       History and Physical   Assessment & Plan:   # Intractable back pain-very severe on exam with unable to perform any passive range of motion.  No weightbearing.  -No acute neurological changes or incontinence.  -IV analgesics, muscle relaxers  -CT abdomen ordered in the ER to rule out retroperitoneal bleed  -Check MRI T/L Spine and spine consult Dr. Bolden   -PT/OT     #Ulcerative colitis#-controlled on PTA medication  # Hypokalemia-replace   2/17  ASSESSMENT & PLAN      ASSESSMENT:  Acute lumbar back pain without radiculopathy     PLAN:  No acute surgical intervention is indicated at this time  MRI thoracic + lumbar spine pending. Further recommendations to follow imaging.  Pain medications and medical management per hospitalist   Okay to work with PT/OT from a NSGY standpoint  Assessment & Plan:   # Intractable back pain-very severe on exam  -No acute neurological changes or incontinence.  -trial norco, valium PO. Can dc flexeril. IV dilaudid prn   -start IV solumedrol. Likely DC on prednisone  -CT abdomen and MRI reviewed, no severe findings. Mild disc bulg  -NS following  -PT/OT - will see tomorrow when improved a bit more    #Ulcerative colitis#-controlled on PTA medication  # Hypokalemia-replace  2/18  Assessment & Plan:      Patient Active Problem List   Diagnosis    Ulcerative colitis, unspecified, without complications (HCC)    Ulcerative chronic pancolitis without complications (HCC)    Acute right-sided low back pain,  unspecified whether sciatica present         Rolando Byers is a(n) 43 year old male with acute back pain consistent with MSK origin. No surgical lesions on imaging, no neural element compression and no symptoms of radiculopathy. Discussed anatomical considerations, namely the presence of 6 lumbar vertebra and the potential for Bartolotti's syndrome. Discussed management of spine health. Neurosurgery to sign off at this time. If follow up is wanted, he may follow in the [place] office.   MEDICATIONS ADMINISTERED IN LAST 1 DAY:  Administration History       None            Vitals (last day) before discharge       Date/Time Temp Pulse Resp BP SpO2 Weight O2 Device O2 Flow Rate (L/min) [facility]    02/17/24 0636 -- 92 -- -- 60 % -- None (Room air) 0 L/min CM          CIWA Scores (last 3 days) before discharge       None          PLEASE FAX DAYS CERTIFIED AND NEXT REVIEW DATE [phone]

## 2024-02-20 NOTE — PROGRESS NOTES
Initial Post Discharge Follow Up   Discharge Date: 2/18/24  Contact Date: 2/20/2024    Consent Verification:  Assessment Completed With: Patient  HIPAA Verified?  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 The patient has been doing well at home. The patient reported back pain has improved and is currently rated 3/10 for intensity. The patient admitted pain is worse in the morning.  The patient did admit to feeling stiff with ambulation. NCM did review/stress the importance of fall precautions. The patient denies any abdominal pain/cramping, constipation, chest pain, SOB, dizziness, palpations or irregular heart beats.    Do you have any pain since discharge?  Yes  Where: back pain    Rating on pain scale 1-10, 10 being the worst pain you have ever experienced, what is current pain: 3  Is the pain manageable at home? Yes  How well was your pain managed while in the hospital?   On a scale of 1-5   1- Very Poor and 5- Very well   Very Well  When you were leaving the hospital were your discharge instructions reviewed with you? Yes  How well were your discharge instructions explained to you?   On a scale of 1-5   1- Very Poor and 5- Very well   Very Well  Do you have any questions about your discharge instructions?  No  Before leaving the hospital was your diagnoses explained to you? Yes  Do you have any questions about your diagnoses? No  Are you able to perform normal daily activities of living as you have prior to your hospital stay (dressing, bathing, ambulating to the bathroom, etc)? yes NCM did review/stress the importance of fall precautions.   (NCM) Was patient given a different diet per AVS? no      Medications:   Current Outpatient Medications   Medication Sig Dispense Refill    diazePAM 5 MG Oral Tab Take 1 tablet (5 mg total) by mouth every 6 (six)  hours as needed (muscle spasm). 30 tablet 0    HYDROcodone-acetaminophen 5-325 MG Oral Tab Take 1 tablet by mouth every 4 (four) hours as needed. 30 tablet 0    predniSONE 10 MG Oral Tab Take 4 tablets (40 mg total) by mouth daily for 2 days, THEN 3 tablets (30 mg total) daily for 2 days, THEN 2 tablets (20 mg total) daily for 2 days, THEN 1 tablet (10 mg total) daily for 2 days. 20 tablet 0    XELJANZ 5 MG Oral Tab Take 1 tablet by mouth 2 (two) times daily. 60 tablet 11     Were there any changes to your current medication(s) noted on the AVS? Yes  If so, were these medication changes discussed with you prior to leaving the hospital? Yes  If a new medication was prescribed:    Was the new medication's purpose & side effects reviewed? Yes  Do you have any questions about your new medication? No  Did you  your discharge medications when you left the hospital? Yes  Let's go over your medications together to make sure we are not missing anything. Medications Reviewed  Are there any reasons that keep you from taking your medication as prescribed? No  Are you having any concerns with constipation? No  Did patient receive their flu shot (Sept-March)? No    Discharge medications reviewed/discussed/and reconciled against outpatient medications with patient.  Any changes or updates to medications sent to PCP.  Patient Acknowledged     Referrals/orders at D/C:  Referrals/orders placed at D/C? no    Except for Home Health Services mentioned above, have you scheduled these other services? No    DME ordered at D/C? No      Discharge orders, AVS reviewed and discussed with patient. Any changes or updates to orders sent to PCP.  Patient Acknowledged      SDOH:   Transportation:   Transportation Needs: No Transportation Needs (2/20/2024)    Transportation Needs     Lack of Transportation: No     Financial Strain:   Financial Resource Strain: Low Risk  (2/20/2024)    Financial Resource Strain     Difficulty of Paying Living  Expenses: Not hard at all     Med Affordability: No     Follow up appointments:      TCC  Was TCC ordered: No    PCP (If no TCC appointment)  Does patient already have a PCP appointment scheduled? No    The patient was last seen by Dr. Bella on 10/6/2020. NCM did confirm Dr. Bella was the patient's PCP. NCM transferred the patient to Dr. Bella's office for scheduling. A TCM-[facility]U appointment was scheduled for 3/1/2024.     Specialist    Does the patient have any other follow up appointment(s) needing to be scheduled? No    NCM did review the following specialist included on the AVS:     Follow up with Anatoliy Byrnes  Specialty: NEUROSURGERY  As needed  [facility]  [address]   [address] [address] [phone]    Is there any reason as to why you cannot make your appointment(s)?  No     Needs post D/C:   Now that you are home, are there any needs or concerns you need addressed before your next visit with your PCP?  (DME, meds, questions, etc.): No    Interventions by NCM:    Reviewed all discharge instructions with the patient with a focus on pain management and fall precuations. All medications were reviewed and educated on the importance of taking the medications as directed.  Patient symptoms were assessed, education was completed for signs/symptoms to monitor, and reviewed when to contact providers vs go to the ED/call 911. Appointments were reviewed and stressed the importance of a close follow up with providers. The patient was transferred to the PCP office for scheduling.  The patient verbalized understanding and will contact the office with any further questions or concerns.       Overall Rating:   How would you rate the care you received while in the hospital? excellent    CCM referral placed:    Not Applicable    BOOK BY DATE: 3/3/2024

## 2024-03-01 ENCOUNTER — OFFICE VISIT (OUTPATIENT)
Dept: INTERNAL MEDICINE CLINIC | Facility: CLINIC | Age: 44
End: 2024-03-01
Payer: COMMERCIAL

## 2024-03-01 VITALS
BODY MASS INDEX: 27.09 KG/M2 | HEIGHT: 72 IN | TEMPERATURE: 98 F | OXYGEN SATURATION: 98 % | RESPIRATION RATE: 14 BRPM | HEART RATE: 80 BPM | WEIGHT: 200 LBS | DIASTOLIC BLOOD PRESSURE: 68 MMHG | SYSTOLIC BLOOD PRESSURE: 118 MMHG

## 2024-03-01 DIAGNOSIS — R53.83 FATIGUE, UNSPECIFIED TYPE: ICD-10-CM

## 2024-03-01 DIAGNOSIS — M48.07 SPINAL STENOSIS OF LUMBOSACRAL REGION: ICD-10-CM

## 2024-03-01 DIAGNOSIS — Z00.00 ROUTINE GENERAL MEDICAL EXAMINATION AT A HEALTH CARE FACILITY: ICD-10-CM

## 2024-03-01 DIAGNOSIS — Z00.00 ANNUAL PHYSICAL EXAM: Primary | ICD-10-CM

## 2024-03-01 PROBLEM — K51.90 ULCERATIVE COLITIS, UNSPECIFIED, WITHOUT COMPLICATIONS (HCC): Status: RESOLVED | Noted: 2020-01-20 | Resolved: 2024-03-01

## 2024-03-01 PROBLEM — M54.50 ACUTE RIGHT-SIDED LOW BACK PAIN, UNSPECIFIED WHETHER SCIATICA PRESENT: Status: RESOLVED | Noted: 2024-02-16 | Resolved: 2024-03-01

## 2024-03-01 PROCEDURE — 99396 PREV VISIT EST AGE 40-64: CPT | Performed by: INTERNAL MEDICINE

## 2024-03-01 NOTE — PROGRESS NOTES
Subjective:   Patient ID: Rolando Byers is a 43 year old male.    HPI  Rolando Byers is a 43 year old male who presents for a complete physical exam.   HPI:   Pt complains of continues back pain. Was admitted for intractable lumbar pain. Workup demonstrated only mild lumbar stenosis.  Pain severity has improved    PAST MEDICAL, SOCIAL, FAMILY HISTORIES REVIEWED WITH PT    Immunization History   Administered Date(s) Administered    Covid-19 Vaccine Moderna 100 mcg/0.5 ml 03/22/2021, 04/26/2021    Covid-19 Vaccine Moderna 50 Mcg/0.25 Ml 12/12/2021    FLULAVAL 6 months & older 0.5 ml Prefilled syringe (85380) 10/06/2020    FLUZONE 6 months and older PFS 0.5 ml ([id number]) 12/22/2016, 10/05/2018, 10/06/2020    Fluvirin, 3 Years & >, Im 11/07/2013    Influenza 12/21/2015    Influenza Vaccine, Preserv Free 12/21/2015    Pneumovax 12/22/2015    Pneumovax 23 12/21/2015    TDAP 01/22/2015, 09/11/2015    Tb Intradermal Test 05/06/2013    Zoster Vaccine Recombinant Adjuvanted (Shingrix) 02/26/2021     Wt Readings from Last 6 Encounters:   03/01/24 200 lb (90.7 kg)   02/16/24 183 lb 6.8 oz (83.2 kg)   11/07/23 203 lb 6.4 oz (92.3 kg)   04/11/23 193 lb (87.5 kg)   10/05/22 204 lb (92.5 kg)   03/07/22 210 lb (95.3 kg)     Body mass index is 27.12 kg/m².     Lab Results   Component Value Date    GLU 84 02/16/2024    GLU 99 11/20/2023     (H) 04/11/2023     Lab Results   Component Value Date    CHOLEST 186 11/20/2023    CHOLEST 195 09/24/2021    CHOLEST 236 (H) 09/16/2021     Lab Results   Component Value Date    HDL 59 11/20/2023    HDL 51 09/24/2021    HDL 53 09/16/2021     Lab Results   Component Value Date     (H) 11/20/2023     (H) 09/24/2021     (H) 09/16/2021     Lab Results   Component Value Date    AST 20 02/16/2024    AST 24 11/20/2023    AST 22 04/11/2023     Lab Results   Component Value Date    ALT 17 02/16/2024    ALT 19 11/20/2023    ALT 25 04/11/2023     No results found for: \"PSA\"      Current Outpatient Medications   Medication Sig Dispense Refill    XELJANZ 5 MG Oral Tab Take 1 tablet by mouth 2 (two) times daily. 60 tablet 11      Past Medical History:   Diagnosis Date    Bloating 11/1/20    Calculus of kidney 2018    Fatigue 11/1/20    Flatulence/gas pain/belching 11/1/20    Sleep disturbance 11/1/20    I wake up during sleep due to bloating    UC (ulcerative colitis) (HCC)       Past Surgical History:   Procedure Laterality Date    APPENDECTOMY        Family History   Problem Relation Age of Onset    Heart Disease Father     Hypertension Mother       Social History:  Social History     Socioeconomic History    Marital status:    Tobacco Use    Smoking status: Never    Smokeless tobacco: Never   Vaping Use    Vaping Use: Never used   Substance and Sexual Activity    Alcohol use: Not Currently     Alcohol/week: 4.0 standard drinks of alcohol     Types: 2 Glasses of wine, 2 Standard drinks or equivalent per week     Comment: weekends; light intake    Drug use: No   Other Topics Concern    Caffeine Concern Yes     Comment: 1 cup daily    Exercise Yes     Comment: 2-3 times weekly     Social Determinants of Health     Financial Resource Strain: Low Risk  (2/20/2024)    Financial Resource Strain     Difficulty of Paying Living Expenses: Not hard at all     Med Affordability: No   Food Insecurity: No Food Insecurity (2/16/2024)    Food Insecurity     Food Insecurity: Never true   Transportation Needs: No Transportation Needs (2/20/2024)    Transportation Needs     Lack of Transportation: No   Housing Stability: Low Risk  (2/16/2024)    Housing Stability     Housing Instability: No      Occ: yes. : yes. Children: yes.   Exercise: once per week,  twice per week.  Diet: watches fats closely and watches sugar closely     REVIEW OF SYSTEMS:   A comprehensive 10 point review of systems was completed.     Pertinent positives and negatives noted in the HPI.      EXAM:   /68   Pulse 80    Temp 97.8 °F (36.6 °C)   Resp 14   Ht 6' (1.829 m)   Wt 200 lb (90.7 kg)   SpO2 98%   BMI 27.12 kg/m²   Body mass index is 27.12 kg/m².   GENERAL: well developed, well nourished,in no apparent distress  SKIN: no rashes,no suspicious lesions  HEENT: atraumatic, normocephalic,ears and throat are clear  EYES:PERRLA, EOMI, ,conjunctiva are clear  NECK: supple,no adenopathy,no bruits  LUNGS: clear to auscultation  CARDIO: RRR without murmur  GI: good BS's,no masses, HSM or tenderness  MUSCULOSKELETAL: back is not tender,FROM of the back  EXTREMITIES: no cyanosis, clubbing or edema  NEURO: Oriented times three,motor and sensory are grossly intact    ASSESSMENT AND PLAN:   Rolando Byers is a 43 year old male who presents for a complete physical exam.  Body mass index is 27.12 kg/m²., recommended low fat diet and aerobic exercise 30 minutes three times weekly.   Health maintenance, will check fasting Lipids, CMP, CBC   UTD screening colonoscopy.   Refer to PT for lumbar stenosis treatment  Pt info handouts given for: exercise, low fat diet, The patient indicates understanding of these issues and agrees to the plan.  The patient is asked to return for CPX in 12 m.    History/Other:   Review of Systems  Current Outpatient Medications   Medication Sig Dispense Refill    XELJANZ 5 MG Oral Tab Take 1 tablet by mouth 2 (two) times daily. 60 tablet 11     Allergies:  Allergies   Allergen Reactions    Iodine (Topical) HIVES    Radiology Contrast Iodinated Dyes RASH       Objective:   Physical Exam    Assessment & Plan:   1. Annual physical exam    2. Routine general medical examination at a health care facility    3. Spinal stenosis of lumbosacral region        Orders Placed This Encounter   Procedures    CBC With Differential With Platelet    Comp Metabolic Panel (14)    Lipid Panel    TSH W Reflex To Free T4    Urinalysis, Routine       Meds This Visit:  Requested Prescriptions      No prescriptions requested or ordered  in this encounter       Imaging & Referrals:  OP REFERRAL TO [facility]

## 2024-03-08 ENCOUNTER — LAB ENCOUNTER (OUTPATIENT)
Dept: LAB | Facility: HOSPITAL | Age: 44
End: 2024-03-08
Attending: INTERNAL MEDICINE
Payer: COMMERCIAL

## 2024-03-08 DIAGNOSIS — R53.83 FATIGUE, UNSPECIFIED TYPE: ICD-10-CM

## 2024-03-08 DIAGNOSIS — Z00.00 ROUTINE GENERAL MEDICAL EXAMINATION AT A HEALTH CARE FACILITY: ICD-10-CM

## 2024-03-08 LAB
ALBUMIN SERPL-MCNC: 4 G/DL (ref 3.4–5)
ALBUMIN/GLOB SERPL: 1.2 {RATIO} (ref 1–2)
ALP LIVER SERPL-CCNC: 40 U/L
ALT SERPL-CCNC: 20 U/L
ANION GAP SERPL CALC-SCNC: 2 MMOL/L (ref 0–18)
AST SERPL-CCNC: 22 U/L (ref 15–37)
BASOPHILS # BLD AUTO: 0.03 X10(3) UL (ref 0–0.2)
BASOPHILS NFR BLD AUTO: 0.6 %
BILIRUB SERPL-MCNC: 0.9 MG/DL (ref 0.1–2)
BILIRUB UR QL STRIP.AUTO: NEGATIVE
BUN BLD-MCNC: 16 MG/DL (ref 9–23)
CALCIUM BLD-MCNC: 9.6 MG/DL (ref 8.5–10.1)
CHLORIDE SERPL-SCNC: 107 MMOL/L (ref 98–112)
CHOLEST SERPL-MCNC: 224 MG/DL (ref ?–200)
CLARITY UR REFRACT.AUTO: CLEAR
CO2 SERPL-SCNC: 29 MMOL/L (ref 21–32)
CREAT BLD-MCNC: 0.91 MG/DL
EGFRCR SERPLBLD CKD-EPI 2021: 107 ML/MIN/1.73M2 (ref 60–?)
EOSINOPHIL # BLD AUTO: 0.26 X10(3) UL (ref 0–0.7)
EOSINOPHIL NFR BLD AUTO: 5.6 %
ERYTHROCYTE [DISTWIDTH] IN BLOOD BY AUTOMATED COUNT: 12 %
FASTING PATIENT LIPID ANSWER: YES
FASTING STATUS PATIENT QL REPORTED: YES
GLOBULIN PLAS-MCNC: 3.4 G/DL (ref 2.8–4.4)
GLUCOSE BLD-MCNC: 90 MG/DL (ref 70–99)
GLUCOSE UR STRIP.AUTO-MCNC: NORMAL MG/DL
HCT VFR BLD AUTO: 41.4 %
HDLC SERPL-MCNC: 59 MG/DL (ref 40–59)
HGB BLD-MCNC: 13.9 G/DL
IMM GRANULOCYTES # BLD AUTO: 0.02 X10(3) UL (ref 0–1)
IMM GRANULOCYTES NFR BLD: 0.4 %
KETONES UR STRIP.AUTO-MCNC: NEGATIVE MG/DL
LDLC SERPL CALC-MCNC: 151 MG/DL (ref ?–100)
LEUKOCYTE ESTERASE UR QL STRIP.AUTO: NEGATIVE
LYMPHOCYTES # BLD AUTO: 1.88 X10(3) UL (ref 1–4)
LYMPHOCYTES NFR BLD AUTO: 40.6 %
MCH RBC QN AUTO: 30 PG (ref 26–34)
MCHC RBC AUTO-ENTMCNC: 33.6 G/DL (ref 31–37)
MCV RBC AUTO: 89.2 FL
MONOCYTES # BLD AUTO: 0.53 X10(3) UL (ref 0.1–1)
MONOCYTES NFR BLD AUTO: 11.4 %
NEUTROPHILS # BLD AUTO: 1.91 X10 (3) UL (ref 1.5–7.7)
NEUTROPHILS # BLD AUTO: 1.91 X10(3) UL (ref 1.5–7.7)
NEUTROPHILS NFR BLD AUTO: 41.4 %
NITRITE UR QL STRIP.AUTO: NEGATIVE
NONHDLC SERPL-MCNC: 165 MG/DL (ref ?–130)
OSMOLALITY SERPL CALC.SUM OF ELEC: 287 MOSM/KG (ref 275–295)
PH UR STRIP.AUTO: 6 [PH] (ref 5–8)
PLATELET # BLD AUTO: 155 10(3)UL (ref 150–450)
POTASSIUM SERPL-SCNC: 4 MMOL/L (ref 3.5–5.1)
PROT SERPL-MCNC: 7.4 G/DL (ref 6.4–8.2)
PROT UR STRIP.AUTO-MCNC: NEGATIVE MG/DL
RBC # BLD AUTO: 4.64 X10(6)UL
RBC UR QL AUTO: NEGATIVE
SODIUM SERPL-SCNC: 138 MMOL/L (ref 136–145)
SP GR UR STRIP.AUTO: 1.02 (ref 1–1.03)
TRIGL SERPL-MCNC: 78 MG/DL (ref 30–149)
TSI SER-ACNC: 0.91 MIU/ML (ref 0.36–3.74)
UROBILINOGEN UR STRIP.AUTO-MCNC: NORMAL MG/DL
VLDLC SERPL CALC-MCNC: 15 MG/DL (ref 0–30)
WBC # BLD AUTO: 4.6 X10(3) UL (ref 4–11)

## 2024-03-08 PROCEDURE — 85025 COMPLETE CBC W/AUTO DIFF WBC: CPT

## 2024-03-08 PROCEDURE — 36415 COLL VENOUS BLD VENIPUNCTURE: CPT

## 2024-03-08 PROCEDURE — 80061 LIPID PANEL: CPT

## 2024-03-08 PROCEDURE — 81003 URINALYSIS AUTO W/O SCOPE: CPT

## 2024-03-08 PROCEDURE — 80053 COMPREHEN METABOLIC PANEL: CPT

## 2024-03-08 PROCEDURE — 84402 ASSAY OF FREE TESTOSTERONE: CPT

## 2024-03-08 PROCEDURE — 84403 ASSAY OF TOTAL TESTOSTERONE: CPT

## 2024-03-08 PROCEDURE — 84443 ASSAY THYROID STIM HORMONE: CPT

## 2024-03-08 NOTE — PROGRESS NOTES
Left detailed message on VM, advised to call back with any questions and will give him a call when we have T levels results, currently they are in process

## 2024-03-12 LAB
FREE TESTOST DIRECT: 9.6 PG/ML
TESTOSTERONE: 516 NG/DL

## 2024-03-13 DIAGNOSIS — M48.07 SPINAL STENOSIS OF LUMBOSACRAL REGION: Primary | ICD-10-CM

## 2024-03-13 NOTE — PROGRESS NOTES
Spoke to pt. Made aware of results & recommendations. Pt voiced understanding.  Pt requested to fax physical therapy orders to [facility]  New Referral placed for external physical therapy  Faxed to [phone] along with FS

## 2025-01-20 ENCOUNTER — LAB ENCOUNTER (OUTPATIENT)
Dept: LAB | Facility: HOSPITAL | Age: 45
End: 2025-01-20
Attending: INTERNAL MEDICINE
Payer: COMMERCIAL

## 2025-01-20 DIAGNOSIS — Z79.899 OTHER LONG TERM (CURRENT) DRUG THERAPY: ICD-10-CM

## 2025-01-20 LAB
ALBUMIN SERPL-MCNC: 4.8 G/DL (ref 3.2–4.8)
ALBUMIN/GLOB SERPL: 1.7 {RATIO} (ref 1–2)
ALP LIVER SERPL-CCNC: 66 U/L
ALT SERPL-CCNC: 13 U/L
ANION GAP SERPL CALC-SCNC: 4 MMOL/L (ref 0–18)
AST SERPL-CCNC: 21 U/L (ref ?–34)
BASOPHILS # BLD AUTO: 0.05 X10(3) UL (ref 0–0.2)
BASOPHILS NFR BLD AUTO: 0.9 %
BILIRUB SERPL-MCNC: 0.6 MG/DL (ref 0.3–1.2)
BUN BLD-MCNC: 17 MG/DL (ref 9–23)
CALCIUM BLD-MCNC: 10.4 MG/DL (ref 8.7–10.6)
CHLORIDE SERPL-SCNC: 104 MMOL/L (ref 98–112)
CO2 SERPL-SCNC: 33 MMOL/L (ref 21–32)
CREAT BLD-MCNC: 1.02 MG/DL
CRP SERPL-MCNC: <0.4 MG/DL (ref ?–0.5)
EGFRCR SERPLBLD CKD-EPI 2021: 92 ML/MIN/1.73M2 (ref 60–?)
EOSINOPHIL # BLD AUTO: 0.21 X10(3) UL (ref 0–0.7)
EOSINOPHIL NFR BLD AUTO: 3.8 %
ERYTHROCYTE [DISTWIDTH] IN BLOOD BY AUTOMATED COUNT: 12 %
FASTING STATUS PATIENT QL REPORTED: NO
GLOBULIN PLAS-MCNC: 2.9 G/DL (ref 2–3.5)
GLUCOSE BLD-MCNC: 103 MG/DL (ref 70–99)
HCT VFR BLD AUTO: 41.1 %
HGB BLD-MCNC: 14.5 G/DL
IMM GRANULOCYTES # BLD AUTO: 0.01 X10(3) UL (ref 0–1)
IMM GRANULOCYTES NFR BLD: 0.2 %
LYMPHOCYTES # BLD AUTO: 2.33 X10(3) UL (ref 1–4)
LYMPHOCYTES NFR BLD AUTO: 42.2 %
MCH RBC QN AUTO: 30.9 PG (ref 26–34)
MCHC RBC AUTO-ENTMCNC: 35.3 G/DL (ref 31–37)
MCV RBC AUTO: 87.6 FL
MONOCYTES # BLD AUTO: 0.57 X10(3) UL (ref 0.1–1)
MONOCYTES NFR BLD AUTO: 10.3 %
NEUTROPHILS # BLD AUTO: 2.35 X10 (3) UL (ref 1.5–7.7)
NEUTROPHILS # BLD AUTO: 2.35 X10(3) UL (ref 1.5–7.7)
NEUTROPHILS NFR BLD AUTO: 42.6 %
OSMOLALITY SERPL CALC.SUM OF ELEC: 294 MOSM/KG (ref 275–295)
PLATELET # BLD AUTO: 181 10(3)UL (ref 150–450)
POTASSIUM SERPL-SCNC: 4 MMOL/L (ref 3.5–5.1)
PROT SERPL-MCNC: 7.7 G/DL (ref 5.7–8.2)
RBC # BLD AUTO: 4.69 X10(6)UL
SODIUM SERPL-SCNC: 141 MMOL/L (ref 136–145)
WBC # BLD AUTO: 5.5 X10(3) UL (ref 4–11)

## 2025-01-20 PROCEDURE — 80053 COMPREHEN METABOLIC PANEL: CPT

## 2025-01-20 PROCEDURE — 86140 C-REACTIVE PROTEIN: CPT

## 2025-01-20 PROCEDURE — 85025 COMPLETE CBC W/AUTO DIFF WBC: CPT

## 2025-01-20 PROCEDURE — 36415 COLL VENOUS BLD VENIPUNCTURE: CPT

## 2025-05-12 ENCOUNTER — OFFICE VISIT (OUTPATIENT)
Dept: INTERNAL MEDICINE CLINIC | Facility: CLINIC | Age: 45
End: 2025-05-12
Payer: COMMERCIAL

## 2025-05-12 VITALS
BODY MASS INDEX: 28.71 KG/M2 | TEMPERATURE: 99 F | HEIGHT: 72 IN | WEIGHT: 212 LBS | SYSTOLIC BLOOD PRESSURE: 124 MMHG | OXYGEN SATURATION: 98 % | RESPIRATION RATE: 16 BRPM | HEART RATE: 69 BPM | DIASTOLIC BLOOD PRESSURE: 82 MMHG

## 2025-05-12 DIAGNOSIS — Z00.00 ROUTINE GENERAL MEDICAL EXAMINATION AT A HEALTH CARE FACILITY: ICD-10-CM

## 2025-05-12 DIAGNOSIS — Z01.84 IMMUNITY STATUS TESTING: ICD-10-CM

## 2025-05-12 DIAGNOSIS — Z00.00 ANNUAL PHYSICAL EXAM: Primary | ICD-10-CM

## 2025-05-12 DIAGNOSIS — M25.512 ACUTE PAIN OF LEFT SHOULDER: ICD-10-CM

## 2025-05-12 NOTE — PROGRESS NOTES
Subjective:   Patient ID: Rolando Byers is a 44 year old male.    HPI  Rolando Byers is a 44 year old male who presents for a complete physical exam.   HPI:   Pt reports normal state of health  Denies cp or sob    Hx of skin accident feb 2025. Fell on left shoulder. Decreased ROM .     PAST MEDICAL, SOCIAL, FAMILY HISTORIES REVIEWED WITH PT  .    Immunization History   Administered Date(s) Administered    Covid-19 Vaccine Moderna 100 mcg/0.5 ml 03/22/2021, 04/26/2021    Covid-19 Vaccine Moderna 50 Mcg/0.25 Ml 12/12/2021    FLULAVAL 6 months & older 0.5 ml Prefilled syringe (66601) 10/06/2020    FLUZONE 6 months and older PFS 0.5 ml (17862) 12/22/2016, 10/05/2018, 10/06/2020    Fluvirin, 3 Years & >, Im 11/07/2013    Influenza 12/21/2015    Influenza Vaccine, Preserv Free 12/21/2015    Pneumovax 12/22/2015    Pneumovax 23 12/21/2015    TDAP 01/22/2015, 09/11/2015    Tb Intradermal Test 05/06/2013    Zoster Vaccine Recombinant Adjuvanted (Shingrix) 02/26/2021     Wt Readings from Last 6 Encounters:   05/12/25 212 lb (96.2 kg)   01/14/25 209 lb (94.8 kg)   03/01/24 200 lb (90.7 kg)   02/16/24 183 lb 6.8 oz (83.2 kg)   11/07/23 203 lb 6.4 oz (92.3 kg)   04/11/23 193 lb (87.5 kg)     Body mass index is 28.75 kg/m².     Lab Results   Component Value Date     (H) 01/20/2025    GLU 90 03/08/2024    GLU 84 02/16/2024     Lab Results   Component Value Date    CHOLEST 224 (H) 03/08/2024    CHOLEST 186 11/20/2023    CHOLEST 195 09/24/2021     Lab Results   Component Value Date    HDL 59 03/08/2024    HDL 59 11/20/2023    HDL 51 09/24/2021     Lab Results   Component Value Date     (H) 03/08/2024     (H) 11/20/2023     (H) 09/24/2021     Lab Results   Component Value Date    AST 21 01/20/2025    AST 22 03/08/2024    AST 20 02/16/2024     Lab Results   Component Value Date    ALT 13 01/20/2025    ALT 20 03/08/2024    ALT 17 02/16/2024     No results found for: \"PSA\"     Current Medications[1]   Past  Medical History[2]   Past Surgical History[3]   Family History[4]   Social History:  Short Social Hx on File[5]   Occ: yes. : yes. Children: yes.   Exercise: once per week,  twice per week.  Diet: watches fats closely and watches sugar closely     REVIEW OF SYSTEMS:   GENERAL: feels well otherwise  A comprehensive 10  point review of systems was completed.     Pertinent positives and negatives noted in the HPI.      EXAM:   /82   Pulse 69   Temp 98.7 °F (37.1 °C)   Resp 16   Ht 6' (1.829 m)   Wt 212 lb (96.2 kg)   SpO2 98%   BMI 28.75 kg/m²   Body mass index is 28.75 kg/m².   GENERAL: well developed, well nourished,in no apparent distress  SKIN: no rashes,no suspicious lesions  HEENT: atraumatic, normocephalic,ears and throat are clear  EYES:PERRLA, EOMI, ,conjunctiva are clear  NECK: supple,no adenopathy,no bruits  LUNGS: clear to auscultation  CARDIO: RRR without murmur  GI: good BS's,no masses, HSM or tenderness  : deferred  MUSCULOSKELETAL: back is not tender  EXTREMITIES: no cyanosis, clubbing or edema  NEURO: Oriented times three,,motor and sensory are grossly intact    ASSESSMENT AND PLAN:   Rolando Byers is a 44 year old male who presents for a complete physical exam.  Body mass index is 28.75 kg/m²., recommended low fat diet and aerobic exercise 30 minutes three times weekly.     Health maintenance, will check fasting Lipids, CMP, CBC.    Pt referred for screening colonoscopy.     Left shoulder pain- refer to ortho for eval    Pt info handouts given for: exercise, low fat diet,     The patient indicates understanding of these issues and agrees to the plan.  The patient is asked to return for CPX in 12 m.    History/Other:   Review of Systems  Current Medications[6]  Allergies:Allergies[7]    Objective:   Physical Exam    Assessment & Plan:   1. Annual physical exam    2. Routine general medical examination at a health care facility    3. Immunity status testing    4. Acute pain of left  shoulder        Orders Placed This Encounter   Procedures    CBC With Differential With Platelet    Comp Metabolic Panel (14)    Lipid Panel    TSH W Reflex To Free T4    Urinalysis, Routine    Quantiferon TB Plus       Meds This Visit:  Requested Prescriptions      No prescriptions requested or ordered in this encounter       Imaging & Referrals:  ORTHOPEDIC - INTERNAL         [1]   Current Outpatient Medications   Medication Sig Dispense Refill    XELJANZ 5 MG Oral Tab Take 1 tablet by mouth 2 (two) times daily. 60 tablet 11   [2]   Past Medical History:   Bloating    Calculus of kidney    Fatigue    Flatulence/gas pain/belching    Sleep disturbance    I wake up during sleep due to bloating    UC (ulcerative colitis) (HCC)   [3]   Past Surgical History:  Procedure Laterality Date    Appendectomy     [4]   Family History  Problem Relation Age of Onset    Heart Disease Father     Hypertension Mother    [5]   Social History  Socioeconomic History    Marital status:    Tobacco Use    Smoking status: Never    Smokeless tobacco: Never   Vaping Use    Vaping status: Never Used   Substance and Sexual Activity    Alcohol use: Yes     Alcohol/week: 4.0 standard drinks of alcohol     Types: 2 Glasses of wine, 2 Standard drinks or equivalent per week     Comment: weekends; light intake    Drug use: No   Other Topics Concern    Caffeine Concern Yes     Comment: 1 cup daily    Exercise Yes     Comment: 2-3 times weekly     Social Drivers of Health     Food Insecurity: No Food Insecurity (2/16/2024)    Food Insecurity     Food Insecurity: Never true   Transportation Needs: No Transportation Needs (2/20/2024)    Transportation Needs     Lack of Transportation: No   Housing Stability: Low Risk  (2/16/2024)    Housing Stability     Housing Instability: No   [6]   Current Outpatient Medications   Medication Sig Dispense Refill    XELJANZ 5 MG Oral Tab Take 1 tablet by mouth 2 (two) times daily. 60 tablet 11   [7]    Allergies  Allergen Reactions    Iodine (Topical) HIVES    Radiology Contrast Iodinated Dyes RASH

## 2025-05-14 ENCOUNTER — TELEPHONE (OUTPATIENT)
Dept: ORTHOPEDICS CLINIC | Facility: CLINIC | Age: 45
End: 2025-05-14

## 2025-05-14 DIAGNOSIS — M25.512 LEFT SHOULDER PAIN, UNSPECIFIED CHRONICITY: Primary | ICD-10-CM

## 2025-05-14 NOTE — TELEPHONE ENCOUNTER
Patient is scheduled for left shoulder pain. Please advise if imaging is needed.  Future Appointments   Date Time Provider Department Center   5/23/2025  3:00 PM HOB DEXA RM1 HOB DEXA [place]   6/6/2025  9:40 AM [id number]   8/11/2025 12:30 PM Zulma Ospina MD [facility] ECC SUB GI   5/12/2026  1:00 PM Deon Bella MD [facility] [address]

## 2025-05-16 ENCOUNTER — LAB ENCOUNTER (OUTPATIENT)
Dept: LAB | Facility: HOSPITAL | Age: 45
End: 2025-05-16
Attending: INTERNAL MEDICINE
Payer: COMMERCIAL

## 2025-05-16 DIAGNOSIS — Z00.00 ROUTINE GENERAL MEDICAL EXAMINATION AT A HEALTH CARE FACILITY: ICD-10-CM

## 2025-05-16 DIAGNOSIS — Z01.84 IMMUNITY STATUS TESTING: ICD-10-CM

## 2025-05-16 LAB
ALBUMIN SERPL-MCNC: 5 G/DL (ref 3.2–4.8)
ALBUMIN/GLOB SERPL: 1.8 {RATIO} (ref 1–2)
ALP LIVER SERPL-CCNC: 53 U/L (ref 45–117)
ALT SERPL-CCNC: 15 U/L (ref 10–49)
ANION GAP SERPL CALC-SCNC: 7 MMOL/L (ref 0–18)
AST SERPL-CCNC: 19 U/L (ref ?–34)
BASOPHILS # BLD AUTO: 0.04 X10(3) UL (ref 0–0.2)
BASOPHILS NFR BLD AUTO: 0.8 %
BILIRUB SERPL-MCNC: 0.8 MG/DL (ref 0.3–1.2)
BILIRUB UR QL STRIP.AUTO: NEGATIVE
BUN BLD-MCNC: 18 MG/DL (ref 9–23)
CALCIUM BLD-MCNC: 9.4 MG/DL (ref 8.7–10.6)
CHLORIDE SERPL-SCNC: 106 MMOL/L (ref 98–112)
CHOLEST SERPL-MCNC: 196 MG/DL (ref ?–200)
CLARITY UR REFRACT.AUTO: CLEAR
CO2 SERPL-SCNC: 27 MMOL/L (ref 21–32)
CREAT BLD-MCNC: 1.09 MG/DL (ref 0.7–1.3)
EGFRCR SERPLBLD CKD-EPI 2021: 85 ML/MIN/1.73M2 (ref 60–?)
EOSINOPHIL # BLD AUTO: 0.17 X10(3) UL (ref 0–0.7)
EOSINOPHIL NFR BLD AUTO: 3.2 %
ERYTHROCYTE [DISTWIDTH] IN BLOOD BY AUTOMATED COUNT: 12.4 %
FASTING PATIENT LIPID ANSWER: YES
FASTING STATUS PATIENT QL REPORTED: YES
GLOBULIN PLAS-MCNC: 2.8 G/DL (ref 2–3.5)
GLUCOSE BLD-MCNC: 94 MG/DL (ref 70–99)
GLUCOSE UR STRIP.AUTO-MCNC: NORMAL MG/DL
HCT VFR BLD AUTO: 42.2 % (ref 39–53)
HDLC SERPL-MCNC: 46 MG/DL (ref 40–59)
HGB BLD-MCNC: 14.5 G/DL (ref 13–17.5)
IMM GRANULOCYTES # BLD AUTO: 0.01 X10(3) UL (ref 0–1)
IMM GRANULOCYTES NFR BLD: 0.2 %
KETONES UR STRIP.AUTO-MCNC: NEGATIVE MG/DL
LDLC SERPL CALC-MCNC: 134 MG/DL (ref ?–100)
LEUKOCYTE ESTERASE UR QL STRIP.AUTO: NEGATIVE
LYMPHOCYTES # BLD AUTO: 2.4 X10(3) UL (ref 1–4)
LYMPHOCYTES NFR BLD AUTO: 45.7 %
MCH RBC QN AUTO: 29.9 PG (ref 26–34)
MCHC RBC AUTO-ENTMCNC: 34.4 G/DL (ref 31–37)
MCV RBC AUTO: 87 FL (ref 80–100)
MONOCYTES # BLD AUTO: 0.47 X10(3) UL (ref 0.1–1)
MONOCYTES NFR BLD AUTO: 9 %
NEUTROPHILS # BLD AUTO: 2.16 X10 (3) UL (ref 1.5–7.7)
NEUTROPHILS # BLD AUTO: 2.16 X10(3) UL (ref 1.5–7.7)
NEUTROPHILS NFR BLD AUTO: 41.1 %
NITRITE UR QL STRIP.AUTO: NEGATIVE
NONHDLC SERPL-MCNC: 150 MG/DL (ref ?–130)
OSMOLALITY SERPL CALC.SUM OF ELEC: 292 MOSM/KG (ref 275–295)
PH UR STRIP.AUTO: 5.5 [PH] (ref 5–8)
PLATELET # BLD AUTO: 182 10(3)UL (ref 150–450)
POTASSIUM SERPL-SCNC: 4.1 MMOL/L (ref 3.5–5.1)
PROT SERPL-MCNC: 7.8 G/DL (ref 5.7–8.2)
PROT UR STRIP.AUTO-MCNC: NEGATIVE MG/DL
RBC # BLD AUTO: 4.85 X10(6)UL (ref 4.3–5.7)
RBC UR QL AUTO: NEGATIVE
SODIUM SERPL-SCNC: 140 MMOL/L (ref 136–145)
SP GR UR STRIP.AUTO: 1.02 (ref 1–1.03)
TRIGL SERPL-MCNC: 89 MG/DL (ref 30–149)
TSI SER-ACNC: 0.84 UIU/ML (ref 0.55–4.78)
UROBILINOGEN UR STRIP.AUTO-MCNC: NORMAL MG/DL
VLDLC SERPL CALC-MCNC: 16 MG/DL (ref 0–30)
WBC # BLD AUTO: 5.3 X10(3) UL (ref 4–11)

## 2025-05-16 PROCEDURE — 36415 COLL VENOUS BLD VENIPUNCTURE: CPT

## 2025-05-16 PROCEDURE — 86480 TB TEST CELL IMMUN MEASURE: CPT

## 2025-05-16 PROCEDURE — 80053 COMPREHEN METABOLIC PANEL: CPT

## 2025-05-16 PROCEDURE — 80061 LIPID PANEL: CPT

## 2025-05-16 PROCEDURE — 84443 ASSAY THYROID STIM HORMONE: CPT

## 2025-05-16 PROCEDURE — 85025 COMPLETE CBC W/AUTO DIFF WBC: CPT

## 2025-05-16 PROCEDURE — 81003 URINALYSIS AUTO W/O SCOPE: CPT

## 2025-05-19 LAB
M TB IFN-G CD4+ T-CELLS BLD-ACNC: 0.13 IU/ML
M TB TUBERC IFN-G BLD QL: NEGATIVE
M TB TUBERC IGNF/MITOGEN IGNF CONTROL: >10 IU/ML
QFT TB1 AG MINUS NIL: -0.02 IU/ML
QFT TB2 AG MINUS NIL: -0.02 IU/ML

## 2025-06-02 ENCOUNTER — HOSPITAL ENCOUNTER (OUTPATIENT)
Dept: BONE DENSITY | Age: 45
Discharge: HOME | End: 2025-06-02
Attending: INTERNAL MEDICINE
Payer: COMMERCIAL

## 2025-06-02 DIAGNOSIS — K51.00 ULCERATIVE PANCOLITIS WITHOUT COMPLICATION (HCC): ICD-10-CM

## 2025-06-02 DIAGNOSIS — Z79.899 OTHER LONG TERM (CURRENT) DRUG THERAPY: ICD-10-CM

## 2025-06-02 PROCEDURE — 77080 DXA BONE DENSITY AXIAL: CPT | Performed by: INTERNAL MEDICINE

## 2025-06-03 ENCOUNTER — TELEPHONE (OUTPATIENT)
Facility: CLINIC | Age: 45
End: 2025-06-03

## 2025-06-03 NOTE — TELEPHONE ENCOUNTER
Future Appointments   Date Time Provider Department Center   6/4/2025  1:50 PM Sana Foley MD [facility] ORTHO [id number]   6/6/2025  9:25 AM WDR XR RM1 WDR XRAY [facility]   6/6/2025  9:40 AM Sana Foley MD [facility] ORTHO [id number]   9/5/2025  9:00 AM Zulma Ospina MD [facility] ECC SUB GI   5/12/2026  1:00 PM Deon Bella MD [facility] [address]

## 2025-06-03 NOTE — TELEPHONE ENCOUNTER
If the patient calls back, please inform him that we can try to fit him on the schedule tomorrow, 06/03/2025 with Dr Foley in the last appointment slot at 1:50pm.     If he insist to have the appointment before 8am, please double book at 7am slot but please reiterate of the wait time due fully booked slots for tomorrow.

## 2025-06-03 NOTE — TELEPHONE ENCOUNTER
Patient reached out to return call to r/s current appointment due to provider being unavailable.    Patient declined tomorrow at 8am because he has a meeting and asked if he can come early than 8am.    Patient stated that he made this appointment over a month ago and is in a lot of pain with limited mobility of left shoulder.    Please advise if patient can be seen early tomorrow or anytime before first available of 7/2.

## 2025-06-04 ENCOUNTER — OFFICE VISIT (OUTPATIENT)
Dept: ORTHOPEDICS CLINIC | Facility: CLINIC | Age: 45
End: 2025-06-04
Payer: COMMERCIAL

## 2025-06-04 ENCOUNTER — HOSPITAL ENCOUNTER (OUTPATIENT)
Dept: GENERAL RADIOLOGY | Age: 45
Discharge: HOME | End: 2025-06-04
Attending: ORTHOPAEDIC SURGERY
Payer: COMMERCIAL

## 2025-06-04 VITALS — HEIGHT: 73.2 IN | BODY MASS INDEX: 26.88 KG/M2 | WEIGHT: 205 LBS

## 2025-06-04 DIAGNOSIS — S46.002A INJURY OF LEFT ROTATOR CUFF, INITIAL ENCOUNTER: Primary | ICD-10-CM

## 2025-06-04 DIAGNOSIS — M25.512 LEFT SHOULDER PAIN, UNSPECIFIED CHRONICITY: ICD-10-CM

## 2025-06-04 PROCEDURE — 73030 X-RAY EXAM OF SHOULDER: CPT | Performed by: ORTHOPAEDIC SURGERY

## 2025-06-04 PROCEDURE — 99204 OFFICE O/P NEW MOD 45 MIN: CPT | Performed by: ORTHOPAEDIC SURGERY

## 2025-06-04 PROCEDURE — 20610 DRAIN/INJ JOINT/BURSA W/O US: CPT | Performed by: ORTHOPAEDIC SURGERY

## 2025-06-04 RX ORDER — TRIAMCINOLONE ACETONIDE 40 MG/ML
40 INJECTION, SUSPENSION INTRA-ARTICULAR; INTRAMUSCULAR ONCE
Status: COMPLETED | OUTPATIENT
Start: 2025-06-04 | End: 2025-06-04

## 2025-06-04 RX ORDER — KETOROLAC TROMETHAMINE 30 MG/ML
30 INJECTION, SOLUTION INTRAMUSCULAR; INTRAVENOUS ONCE
Status: COMPLETED | OUTPATIENT
Start: 2025-06-04 | End: 2025-06-04

## 2025-06-04 RX ADMIN — KETOROLAC TROMETHAMINE 30 MG: 30 INJECTION, SOLUTION INTRAMUSCULAR; INTRAVENOUS at 14:09:00

## 2025-06-04 RX ADMIN — TRIAMCINOLONE ACETONIDE 40 MG: 40 INJECTION, SUSPENSION INTRA-ARTICULAR; INTRAMUSCULAR at 14:09:00

## 2025-06-04 NOTE — PROCEDURES
Left Shoulder Glenohumeral Joint Injection    Name: Rolando Byers   MRN: [id number]  Date: 6/4/2025     Clinical Indications:   Persistent Shoulder pain refractory to conservative measures.     After informed consent, the injection site was marked, sterilized with topical chlorhexidine antiseptic, and locally anesthetized with skin refrigerant.    The patient was seated upright and the shoulder was exposed. Using sterile technique: 1 mL of 30mg/mL of Ketorolac, 2 mL of 0.5% Bupivicaine, 2 mL of 1% Lidocaine, and 1 mg of 40mg/mL of Triamcinolone (Kenalog) was injected with a Anterior approach utilizing a 22 gauge needle.  A band-aid was applied.  The patient tolerated the procedure well.        aSna Foley MD  Knee, Shoulder, & Elbow Surgery / Sports Medicine Specialist  Orthopaedic Surgery  [address] [place] [address].[facility]  [email or web address]  t: [phone]  o: [phone]  f: [phone]

## 2025-06-04 NOTE — H&P
Orthopaedic Surgery  [address] [place] [address]  [phone]     NEW PATIENT VISIT - HISTORY AND PHYSICAL EXAMINATION     Name: Rolando Byers   MRN: [id number]  Date: 6/4/2025     CC: Left shoulder pain    REFERRED BY: Deon Bella MD    HPI:   Rolando Byers is a very pleasant 44 year old left-hand dominant male who presents today for evaluation of left shoulder pain that started in February 2025 when he fell during a skiing trip. He reports constant, 5/10 pain with accompanying difficulty lifting his arm over head. He has not attempted any conservative treatments at this time, however he has tried some at-home resistance band exercises.    He works as a technologist.  PMH:   Past Medical History[1]    PAST SURGICAL HX:  Past Surgical History[2]    FAMILY HX:  Family History[3]    ALLERGIES:  Iodine (topical) and Radiology contrast iodinated dyes    MEDICATIONS: Current Medications[4]    ROS: A comprehensive 14 point review of systems was performed and was negative aside from the aforementioned per history of present illness.    SOCIAL HX:  Social History     Tobacco Use    Smoking status: Never    Smokeless tobacco: Never   Substance Use Topics    Alcohol use: Yes     Alcohol/week: 4.0 standard drinks of alcohol     Types: 2 Glasses of wine, 2 Standard drinks or equivalent per week     Comment: weekends; light intake       PE:   Vitals:    06/04/25 1322   Weight: 205 lb   Height: 6' 1.2\" (1.859 m)     Estimated body mass index is 26.9 kg/m² as calculated from the following:    Height as of this encounter: 6' 1.2\" (1.859 m).    Weight as of this encounter: 205 lb.    Physical Exam  Constitutional:       Appearance: Normal appearance.   HENT:      Head: Normocephalic and atraumatic.   Eyes:      Extraocular Movements: Extraocular movements intact.   Neck:      Musculoskeletal: Normal range of motion and neck supple.   Cardiovascular:      Pulses: Normal pulses.   Pulmonary:      Effort: Pulmonary  effort is normal. No respiratory distress.   Abdominal:      General: There is no distension.   Skin:     General: Skin is warm.      Capillary Refill: Capillary refill takes less than 2 seconds.      Findings: No bruising.   Neurological:      General: No focal deficit present.      Mental Status: Alert.   Psychiatric:         Mood and Affect: Mood normal.     Examination of the left shoulder demonstrates:   Skin is intact, warm and dry.   Cervical:  Full ROM  Spurling's  Negative    Deformity:   none  Atrophy:   none    Scapular winging: Negative    Palpation:     AC Joint   Negative  Biceps Tendon  Negative  Greater Tuberosity Negative    ROM:   Forward Flexion:  full and symmetric  Abduction:   full and symmetric  External Rotation:  full and symmetric  Internal Rotation:  full and symmetric    Rotator Cuff Strength:   Supraspinatus:   4/5  Subscapularis:   5/5  Infraspinatus/Teres: 5/5    Provocative Tests:   Hair:   Negative  Speed's:   Positive  [place]'s:   Positive  Lift-off:    Negative  Apprehension:  Negative  Sulcus Sign:   Negative    Neurovascular Upper Extremity (Bilateral)  Motor:    5/5 EPL, Finger Abduction, , Pinch, Deltoid  Sensation:   intact to light touch median, ulnar, radial and axillary nerve  Circulation:   Normal, 2+ radial pulse    The contralateral upper extremity is without limitation in range of motion or strength, no positive provocative maneuvers.       Radiographic Examination/Diagnostics:    Shoulder XR personally viewed, independently interpreted and radiology report was reviewed.    XR SHOULDER, COMPLETE (MIN 2 VIEWS), LEFT (CPT=73030)  Result Date: 6/4/2025  PROCEDURE:  XR SHOULDER, COMPLETE (MIN 2 VIEWS), LEFT (CPT=73030)  TECHNIQUE:  Multiple views were obtained.  COMPARISON:  None.  INDICATIONS:  M25.512 Left shoulder pain, unspecified chronicity  PATIENT STATED HISTORY: (As transcribed by Technologist)  Ortho evaluation. Patient fell while skiing in February and  landed directly onto his left shoulder. Patient stated at the time he was unable to move his arm and is able to now but still has pain with movement. Patient stated most of his pain is located posteriorly and laterally.    FINDINGS:  No acute fracture or dislocation is seen.  No significant degenerative changes are seen.  There is normal bone mineral density.  If clinical symptoms persist then recommend follow-up imaging including MRI.            CONCLUSION:  See above.   LOCATION:  [id number]   Dictated by (CST): Yahir Borges MD on 6/04/2025 at 1:42 PM     Finalized by (CST): Yahir Borges MD on 6/04/2025 at 1:42 PM       IMPRESSION: Rolando Byers is a 44 year old Left hand dominant male who presents with suspected left shoulder rotator cuff injury symptomatic since a fall in February of 2025.    PLAN:   We had a detailed discussion outlining the etiology, anatomy, pathophysiology, and natural history of rotator cuff injuries. Imaging was reviewed in detail and correlated to a 3-dimensional model of the shoulder.     In light of the acute traumatic incident, loss of normal function, and  failure to progress conservatively we recommend an MRI to evaluate the integrity of the patient's left shoulder. The patient will follow up after imaging.   Differential diagnosis includes but not limited to: rotator cuff/labral pathology, impingement, tendinopathy, cartilage injury/loose body, bone marrow edema, and osteoarthritis.     External records were also reviewed for pertinent historical findings contributing to the patients undiagnosed new problem with uncertain prognosis.     We additionally elected to offer him corticosteroid injection to help maximize conservative measures at this time      The patient had the opportunity to ask questions, and all questions were answered appropriately.      FOLLOW-UP:   Return to clinic after MRI completion.       Sana Foley MD  Knee, Shoulder, & Elbow Surgery / Sports Medicine  Specialist  Orthopaedic Surgery  [address] [place] [address]   [email or web address]  [email or web address]  t: [phone]  o: [phone]  f: [phone]    This note was dictated using Dragon software.  While it was briefly proofread prior to completion, some grammatical, spelling, and word choice errors due to dictation may still occur.  The patient verbally consented to be recorded using ambient AI listening technology and understand that they can each withdraw their consent to this listening technology at any point by asking the clinician to turn off or pause the recording:  I, Karine Chavira, attest that this documentation has been prepared under the direction and in the presence of Dr. Sana Foley MD.            [1]   Past Medical History:   Bloating    Calculus of kidney    Fatigue    Flatulence/gas pain/belching    Sleep disturbance    I wake up during sleep due to bloating    UC (ulcerative colitis) (HCC)   [2]   Past Surgical History:  Procedure Laterality Date    Appendectomy     [3]   Family History  Problem Relation Age of Onset    Heart Disease Father     Hypertension Mother    [4]   Current Outpatient Medications   Medication Sig Dispense Refill    XELJANZ 5 MG Oral Tab Take 1 tablet by mouth 2 (two) times daily. 60 tablet 11

## 2025-06-08 NOTE — PROGRESS NOTES
Results reviewed.  Relayed to patient via Mychart, Letter or Phone call  +osteopenia    Zulma Ospina MD

## (undated) NOTE — LETTER
Date: 2018  Patient Name:  Carlos Portillo             Address: [address] [place] [address] Drive    : 1980    Dear Carlos Protillo,    I hope this letter finds you well.   The results of your recent stool test showed mild elevation in the inflammator

## (undated) NOTE — LETTER
02/17/21  Rolando Byers  9/1/1980    Order: Shingrix Vaccine X 2.  given 2-6 months apart    Nathan Bernstein MD

## (undated) NOTE — ED AVS SNAPSHOT
Mr. Sonja Shepard   MRN: [id number]    Department:  [facility] Emergency Department   Date of Visit:  3/9/2019           Disclosure     Insurance plans vary and the physician(s) referred by the ER may not be covered by your plan.  Please contact yo tell this physician (or your personal doctor if your instructions are to return to your personal doctor) about any new or lasting problems. The primary care or specialist physician will see patients referred from the [facility] Emergency Department.  Adelso Barnett

## (undated) NOTE — ED AVS SNAPSHOT
Mr. Luis Nolan   MRN: [id number]    Department:  [facility] Emergency Department   Date of Visit:  9/7/2018           Disclosure     Insurance plans vary and the physician(s) referred by the ER may not be covered by your plan.  Please contact yo tell this physician (or your personal doctor if your instructions are to return to your personal doctor) about any new or lasting problems. The primary care or specialist physician will see patients referred from the [facility] Emergency Department.  Dayana Torre

## (undated) NOTE — LETTER
Date: 2/15/2018  Patient Name:  Sultana Gutierres             Address: [address] [place] [address] Drive    : 1980    Dear Sultana Gutierres,    I hope this letter finds you well. The results of your recent stool testing for C.diff was negative.     Please ca

## (undated) NOTE — ED AVS SNAPSHOT
Mr. Lancaster Tony   MRN: [id number]    Department:  [facility] Emergency Department   Date of Visit:  10/22/2019           Disclosure     Insurance plans vary and the physician(s) referred by the ER may not be covered by your plan.  Please contact tell this physician (or your personal doctor if your instructions are to return to your personal doctor) about any new or lasting problems. The primary care or specialist physician will see patients referred from the [facility] Emergency Department.  Arthor Bernheim

## (undated) NOTE — LETTER
Date: 2018  Patient Name:  Sherrill Henao             Address: [address] [place] [place] [address] Drive    : 1980    Dear Sherrill Henao,      I hope this letter finds you well. Your recent screen for TB was negative.     Please call the office or message